# Patient Record
Sex: MALE | Race: WHITE | NOT HISPANIC OR LATINO
[De-identification: names, ages, dates, MRNs, and addresses within clinical notes are randomized per-mention and may not be internally consistent; named-entity substitution may affect disease eponyms.]

---

## 2017-01-06 ENCOUNTER — APPOINTMENT (OUTPATIENT)
Dept: UROLOGY | Facility: CLINIC | Age: 67
End: 2017-01-06

## 2017-01-06 ENCOUNTER — OUTPATIENT (OUTPATIENT)
Dept: OUTPATIENT SERVICES | Facility: HOSPITAL | Age: 67
LOS: 1 days | End: 2017-01-06
Payer: MEDICARE

## 2017-01-06 VITALS
SYSTOLIC BLOOD PRESSURE: 127 MMHG | TEMPERATURE: 97.7 F | DIASTOLIC BLOOD PRESSURE: 82 MMHG | RESPIRATION RATE: 16 BRPM | HEART RATE: 66 BPM

## 2017-01-06 DIAGNOSIS — R35.0 FREQUENCY OF MICTURITION: ICD-10-CM

## 2017-01-06 DIAGNOSIS — Z98.89 OTHER SPECIFIED POSTPROCEDURAL STATES: Chronic | ICD-10-CM

## 2017-01-06 PROCEDURE — 74455 X-RAY URETHRA/BLADDER: CPT

## 2017-01-06 PROCEDURE — 51600 INJECTION FOR BLADDER X-RAY: CPT

## 2017-01-17 DIAGNOSIS — Q64.10 EXSTROPHY OF URINARY BLADDER, UNSPECIFIED: ICD-10-CM

## 2017-01-17 DIAGNOSIS — R10.2 PELVIC AND PERINEAL PAIN: ICD-10-CM

## 2017-04-25 ENCOUNTER — APPOINTMENT (OUTPATIENT)
Dept: UROLOGY | Facility: CLINIC | Age: 67
End: 2017-04-25

## 2017-04-25 ENCOUNTER — OUTPATIENT (OUTPATIENT)
Dept: OUTPATIENT SERVICES | Facility: HOSPITAL | Age: 67
LOS: 1 days | End: 2017-04-25
Payer: MEDICARE

## 2017-04-25 DIAGNOSIS — R35.0 FREQUENCY OF MICTURITION: ICD-10-CM

## 2017-04-25 DIAGNOSIS — Z98.89 OTHER SPECIFIED POSTPROCEDURAL STATES: Chronic | ICD-10-CM

## 2017-04-25 PROCEDURE — 88112 CYTOPATH CELL ENHANCE TECH: CPT | Mod: 26

## 2017-04-25 PROCEDURE — 76775 US EXAM ABDO BACK WALL LIM: CPT

## 2017-04-26 LAB — CORE LAB FLUID CYTOLOGY: NORMAL

## 2017-05-02 DIAGNOSIS — R31.0 GROSS HEMATURIA: ICD-10-CM

## 2017-06-15 ENCOUNTER — FORM ENCOUNTER (OUTPATIENT)
Age: 67
End: 2017-06-15

## 2017-06-16 ENCOUNTER — OUTPATIENT (OUTPATIENT)
Dept: OUTPATIENT SERVICES | Facility: HOSPITAL | Age: 67
LOS: 1 days | End: 2017-06-16
Payer: MEDICARE

## 2017-06-16 ENCOUNTER — APPOINTMENT (OUTPATIENT)
Dept: CT IMAGING | Facility: IMAGING CENTER | Age: 67
End: 2017-06-16

## 2017-06-16 ENCOUNTER — APPOINTMENT (OUTPATIENT)
Dept: UROLOGY | Facility: CLINIC | Age: 67
End: 2017-06-16

## 2017-06-16 DIAGNOSIS — R31.0 GROSS HEMATURIA: ICD-10-CM

## 2017-06-16 DIAGNOSIS — Z98.89 OTHER SPECIFIED POSTPROCEDURAL STATES: Chronic | ICD-10-CM

## 2017-06-16 PROCEDURE — 82565 ASSAY OF CREATININE: CPT

## 2017-06-16 PROCEDURE — 74178 CT ABD&PLV WO CNTR FLWD CNTR: CPT

## 2017-06-19 LAB — CORE LAB FLUID CYTOLOGY: NORMAL

## 2017-07-11 ENCOUNTER — OUTPATIENT (OUTPATIENT)
Dept: OUTPATIENT SERVICES | Facility: HOSPITAL | Age: 67
LOS: 1 days | End: 2017-07-11
Payer: MEDICARE

## 2017-07-11 ENCOUNTER — APPOINTMENT (OUTPATIENT)
Dept: UROLOGY | Facility: CLINIC | Age: 67
End: 2017-07-11

## 2017-07-11 VITALS — HEART RATE: 84 BPM | SYSTOLIC BLOOD PRESSURE: 137 MMHG | RESPIRATION RATE: 16 BRPM | DIASTOLIC BLOOD PRESSURE: 92 MMHG

## 2017-07-11 DIAGNOSIS — R35.0 FREQUENCY OF MICTURITION: ICD-10-CM

## 2017-07-11 DIAGNOSIS — Z98.89 OTHER SPECIFIED POSTPROCEDURAL STATES: Chronic | ICD-10-CM

## 2017-07-11 PROCEDURE — 52000 CYSTOURETHROSCOPY: CPT

## 2017-07-11 RX ORDER — CIPROFLOXACIN HYDROCHLORIDE 250 MG/1
250 TABLET, FILM COATED ORAL
Qty: 30 | Refills: 0 | Status: ACTIVE | COMMUNITY
Start: 2017-07-11 | End: 1900-01-01

## 2017-07-13 DIAGNOSIS — R31.0 GROSS HEMATURIA: ICD-10-CM

## 2017-10-03 ENCOUNTER — APPOINTMENT (OUTPATIENT)
Dept: UROLOGY | Facility: CLINIC | Age: 67
End: 2017-10-03
Payer: MEDICARE

## 2017-10-03 PROCEDURE — 99213 OFFICE O/P EST LOW 20 MIN: CPT

## 2017-12-12 ENCOUNTER — APPOINTMENT (OUTPATIENT)
Dept: UROLOGY | Facility: CLINIC | Age: 67
End: 2017-12-12

## 2018-05-28 ENCOUNTER — FORM ENCOUNTER (OUTPATIENT)
Age: 68
End: 2018-05-28

## 2018-05-29 ENCOUNTER — APPOINTMENT (OUTPATIENT)
Dept: MRI IMAGING | Facility: CLINIC | Age: 68
End: 2018-05-29
Payer: MEDICARE

## 2018-05-29 ENCOUNTER — OUTPATIENT (OUTPATIENT)
Dept: OUTPATIENT SERVICES | Facility: HOSPITAL | Age: 68
LOS: 1 days | End: 2018-05-29

## 2018-05-29 DIAGNOSIS — Q64.10 EXSTROPHY OF URINARY BLADDER, UNSPECIFIED: ICD-10-CM

## 2018-05-29 DIAGNOSIS — Z98.89 OTHER SPECIFIED POSTPROCEDURAL STATES: Chronic | ICD-10-CM

## 2018-05-29 DIAGNOSIS — Z43.6 ENCOUNTER FOR ATTENTION TO OTHER ARTIFICIAL OPENINGS OF URINARY TRACT: ICD-10-CM

## 2018-05-29 PROCEDURE — 72197 MRI PELVIS W/O & W/DYE: CPT | Mod: 26

## 2018-06-12 ENCOUNTER — APPOINTMENT (OUTPATIENT)
Dept: UROLOGY | Facility: CLINIC | Age: 68
End: 2018-06-12
Payer: MEDICARE

## 2018-06-12 PROCEDURE — 99213 OFFICE O/P EST LOW 20 MIN: CPT

## 2018-08-19 ENCOUNTER — MOBILE ON CALL (OUTPATIENT)
Age: 68
End: 2018-08-19

## 2018-08-21 ENCOUNTER — FORM ENCOUNTER (OUTPATIENT)
Age: 68
End: 2018-08-21

## 2018-08-22 ENCOUNTER — OUTPATIENT (OUTPATIENT)
Dept: OUTPATIENT SERVICES | Facility: HOSPITAL | Age: 68
LOS: 1 days | End: 2018-08-22
Payer: MEDICARE

## 2018-08-22 ENCOUNTER — APPOINTMENT (OUTPATIENT)
Dept: CT IMAGING | Facility: CLINIC | Age: 68
End: 2018-08-22
Payer: MEDICARE

## 2018-08-22 DIAGNOSIS — Z00.8 ENCOUNTER FOR OTHER GENERAL EXAMINATION: ICD-10-CM

## 2018-08-22 DIAGNOSIS — Z98.89 OTHER SPECIFIED POSTPROCEDURAL STATES: Chronic | ICD-10-CM

## 2018-08-22 PROCEDURE — 74178 CT ABD&PLV WO CNTR FLWD CNTR: CPT | Mod: 26

## 2018-08-22 PROCEDURE — 74178 CT ABD&PLV WO CNTR FLWD CNTR: CPT

## 2018-12-10 ENCOUNTER — FORM ENCOUNTER (OUTPATIENT)
Age: 68
End: 2018-12-10

## 2018-12-11 ENCOUNTER — OUTPATIENT (OUTPATIENT)
Dept: OUTPATIENT SERVICES | Facility: HOSPITAL | Age: 68
LOS: 1 days | End: 2018-12-11
Payer: MEDICARE

## 2018-12-11 ENCOUNTER — APPOINTMENT (OUTPATIENT)
Dept: UROLOGY | Facility: CLINIC | Age: 68
End: 2018-12-11
Payer: MEDICARE

## 2018-12-11 ENCOUNTER — APPOINTMENT (OUTPATIENT)
Dept: ULTRASOUND IMAGING | Facility: IMAGING CENTER | Age: 68
End: 2018-12-11
Payer: MEDICARE

## 2018-12-11 DIAGNOSIS — Z98.89 OTHER SPECIFIED POSTPROCEDURAL STATES: Chronic | ICD-10-CM

## 2018-12-11 DIAGNOSIS — Z43.6 ENCOUNTER FOR ATTENTION TO OTHER ARTIFICIAL OPENINGS OF URINARY TRACT: ICD-10-CM

## 2018-12-11 PROCEDURE — 76775 US EXAM ABDO BACK WALL LIM: CPT

## 2018-12-11 PROCEDURE — 76775 US EXAM ABDO BACK WALL LIM: CPT | Mod: 26

## 2018-12-11 PROCEDURE — 99213 OFFICE O/P EST LOW 20 MIN: CPT

## 2019-06-21 ENCOUNTER — APPOINTMENT (OUTPATIENT)
Dept: UROLOGY | Facility: CLINIC | Age: 69
End: 2019-06-21
Payer: MEDICARE

## 2019-06-21 ENCOUNTER — OUTPATIENT (OUTPATIENT)
Dept: OUTPATIENT SERVICES | Facility: HOSPITAL | Age: 69
LOS: 1 days | End: 2019-06-21
Payer: MEDICARE

## 2019-06-21 DIAGNOSIS — Z87.448 PERSONAL HISTORY OF OTHER DISEASES OF URINARY SYSTEM: ICD-10-CM

## 2019-06-21 DIAGNOSIS — R35.0 FREQUENCY OF MICTURITION: ICD-10-CM

## 2019-06-21 DIAGNOSIS — Z43.6 ENCOUNTER FOR ATTENTION TO OTHER ARTIFICIAL OPENINGS OF URINARY TRACT: ICD-10-CM

## 2019-06-21 DIAGNOSIS — Z98.89 OTHER SPECIFIED POSTPROCEDURAL STATES: Chronic | ICD-10-CM

## 2019-06-21 DIAGNOSIS — R10.2 PELVIC AND PERINEAL PAIN: ICD-10-CM

## 2019-06-21 PROCEDURE — 76775 US EXAM ABDO BACK WALL LIM: CPT | Mod: 26

## 2019-06-21 PROCEDURE — 76775 US EXAM ABDO BACK WALL LIM: CPT

## 2019-06-21 PROCEDURE — 99212 OFFICE O/P EST SF 10 MIN: CPT | Mod: 25

## 2019-06-21 NOTE — HISTORY OF PRESENT ILLNESS
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 he was born with exstrophy. Last year  he had complained about having pelvic pain - intermittent sharp pains; no change in urethral discharge and no bleeding. Had him get a CT and MRI as concerned about underlying malignancy in remaining organs (cystectomy as child). - ok; some dilated SV. Seems to have resolved though has occasional discomfort. However more recently he felt , overall, not well with myriad of non-de script complaints.  More concerning intermittent bouts of gross hematuria - no flank pain or clots. Blood in urine and NOT from stoma.  He saw local urologist and PSA Ok.\par  He still has some pelvic discomfort - not as before and lasts for shorter periods of time.\par Repeat MRI - no change ion size or nature of lesion. \par  No issues with stoma - changes twice a week. \par Major issues now is recovery from neck surgery. \par no more hematuria since beginning  of Summer and no more pelvic pain issues.\par USG today no hydronephrosis\par

## 2019-06-25 DIAGNOSIS — R10.2 PELVIC AND PERINEAL PAIN: ICD-10-CM

## 2019-06-25 DIAGNOSIS — Z87.448 PERSONAL HISTORY OF OTHER DISEASES OF URINARY SYSTEM: ICD-10-CM

## 2019-06-25 DIAGNOSIS — Z43.6 ENCOUNTER FOR ATTENTION TO OTHER ARTIFICIAL OPENINGS OF URINARY TRACT: ICD-10-CM

## 2019-12-04 ENCOUNTER — CLINICAL ADVICE (OUTPATIENT)
Age: 69
End: 2019-12-04

## 2019-12-06 ENCOUNTER — APPOINTMENT (OUTPATIENT)
Dept: UROLOGY | Facility: CLINIC | Age: 69
End: 2019-12-06

## 2020-10-27 ENCOUNTER — APPOINTMENT (OUTPATIENT)
Dept: UROLOGY | Facility: CLINIC | Age: 70
End: 2020-10-27
Payer: MEDICARE

## 2020-10-27 VITALS — HEART RATE: 71 BPM | DIASTOLIC BLOOD PRESSURE: 84 MMHG | SYSTOLIC BLOOD PRESSURE: 126 MMHG

## 2020-10-27 VITALS — TEMPERATURE: 96.5 F

## 2020-10-27 DIAGNOSIS — R31.0 GROSS HEMATURIA: ICD-10-CM

## 2020-10-27 PROCEDURE — 99212 OFFICE O/P EST SF 10 MIN: CPT

## 2020-10-27 NOTE — HISTORY OF PRESENT ILLNESS
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 he was born with exstrophy. Last year  he had complained about having pelvic pain - intermittent sharp pains; no change in urethral discharge and no bleeding. Had him get a CT and MRI as concerned about underlying malignancy in remaining organs (cystectomy as child). - ok; some dilated SV. Seems to have resolved though has occasional discomfort. However more recently he felt , overall, not well with myriad of non-de script complaints.  More concerning intermittent bouts of gross hematuria - no flank pain or clots. Blood in urine and NOT from stoma.  He saw local urologist and PSA Ok.\par  He still has some pelvic discomfort - not as before and lasts for shorter periods of time.\par Repeat MRI - no change ion size or nature of lesion. \par  No issues with stoma - changes twice a week. \par Major issues now is recovery from neck surgery. \par no more hematuria since beginning  of Summer and no more pelvic pain issues.\par USG today no hydronephrosis\par \par Here with hematuria starting last week with clots. . Also RLQ pain with bloating. Didn't feel well with HA. No fevers LAst 3 days and all resolved. no pelvic pain or rectal pain. \par

## 2020-10-30 ENCOUNTER — APPOINTMENT (OUTPATIENT)
Dept: CT IMAGING | Facility: CLINIC | Age: 70
End: 2020-10-30
Payer: MEDICARE

## 2020-10-30 PROCEDURE — 74178 CT ABD&PLV WO CNTR FLWD CNTR: CPT | Mod: MH

## 2020-10-30 PROCEDURE — Q9967F: CUSTOM

## 2020-10-30 PROCEDURE — 82565A: CUSTOM | Mod: QW

## 2021-01-25 ENCOUNTER — NON-APPOINTMENT (OUTPATIENT)
Age: 71
End: 2021-01-25

## 2021-10-26 ENCOUNTER — APPOINTMENT (OUTPATIENT)
Dept: UROLOGY | Facility: CLINIC | Age: 71
End: 2021-10-26
Payer: MEDICARE

## 2021-10-26 ENCOUNTER — OUTPATIENT (OUTPATIENT)
Dept: OUTPATIENT SERVICES | Facility: HOSPITAL | Age: 71
LOS: 1 days | End: 2021-10-26
Payer: MEDICARE

## 2021-10-26 DIAGNOSIS — Z98.89 OTHER SPECIFIED POSTPROCEDURAL STATES: Chronic | ICD-10-CM

## 2021-10-26 PROCEDURE — 76775 US EXAM ABDO BACK WALL LIM: CPT

## 2021-10-26 PROCEDURE — 76775 US EXAM ABDO BACK WALL LIM: CPT | Mod: 26

## 2021-10-26 PROCEDURE — 99212 OFFICE O/P EST SF 10 MIN: CPT

## 2021-10-26 NOTE — HISTORY OF PRESENT ILLNESS
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 he was born with exstrophy. Last year  he had complained about having pelvic pain - intermittent sharp pains; no change in urethral discharge and no bleeding. Had him get a CT and MRI as concerned about underlying malignancy in remaining organs (cystectomy as child). - ok; some dilated SV. Seems to have resolved though has occasional discomfort. However more recently he felt , overall, not well with myriad of non-de script complaints.  More concerning intermittent bouts of gross hematuria - no flank pain or clots. Blood in urine and NOT from stoma.  He saw local urologist and PSA Ok.\par  He still has some pelvic discomfort - not as before and lasts for shorter periods of time.\par Repeat MRI - no change ion size or nature of lesion. \par  No issues with stoma - changes twice a week. \par Major issues now is recovery from neck surgery. \par no more hematuria since beginning  of Summer and no more pelvic pain issues.\par USG today no hydronephrosis\par \par Here with hematuria starting last week with clots. . Also RLQ pain with bloating. Didn't feel well with HA. No fevers LAst 3 days and all resolved. no pelvic pain or rectal pain. \par \par 10/21 - here for f/o doing well. No episodes of hematuria and the pelvic discomfort he had experienced no more. 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 \par

## 2021-11-17 DIAGNOSIS — Z93.6 OTHER ARTIFICIAL OPENINGS OF URINARY TRACT STATUS: ICD-10-CM

## 2022-12-02 ENCOUNTER — APPOINTMENT (OUTPATIENT)
Dept: UROLOGY | Facility: CLINIC | Age: 72
End: 2022-12-02

## 2022-12-02 ENCOUNTER — OUTPATIENT (OUTPATIENT)
Dept: OUTPATIENT SERVICES | Facility: HOSPITAL | Age: 72
LOS: 1 days | End: 2022-12-02
Payer: MEDICARE

## 2022-12-02 DIAGNOSIS — R35.0 FREQUENCY OF MICTURITION: ICD-10-CM

## 2022-12-02 DIAGNOSIS — Z98.89 OTHER SPECIFIED POSTPROCEDURAL STATES: Chronic | ICD-10-CM

## 2022-12-02 PROCEDURE — 76775 US EXAM ABDO BACK WALL LIM: CPT | Mod: 26

## 2022-12-02 PROCEDURE — 76775 US EXAM ABDO BACK WALL LIM: CPT

## 2022-12-02 PROCEDURE — 99212 OFFICE O/P EST SF 10 MIN: CPT

## 2022-12-02 NOTE — HISTORY OF PRESENT ILLNESS
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 he was born with exstrophy. Last year  he had complained about having pelvic pain - intermittent sharp pains; no change in urethral discharge and no bleeding. Had him get a CT and MRI as concerned about underlying malignancy in remaining organs (cystectomy as child). - ok; some dilated SV. Seems to have resolved though has occasional discomfort. However more recently he felt , overall, not well with myriad of non-de script complaints.  More concerning intermittent bouts of gross hematuria - no flank pain or clots. Blood in urine and NOT from stoma.  He saw local urologist and PSA Ok.\par  He still has some pelvic discomfort - not as before and lasts for shorter periods of time.\par Repeat MRI - no change ion size or nature of lesion. \par  No issues with stoma - changes twice a week. \par Major issues now is recovery from neck surgery. \par no more hematuria since beginning  of Summer and no more pelvic pain issues.\par USG today no hydronephrosis\par \par Here with hematuria starting last week with clots. . Also RLQ pain with bloating. Didn't feel well with HA. No fevers LAst 3 days and all resolved. no pelvic pain or rectal pain. \par \par 10/21 - here for f/o doing well. No episodes of hematuria and the pelvic discomfort he had experienced no more. 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 \par \par 12/22 doing well. No more hematuria or pelvic pain/discharge \par ULS  - no hydro

## 2022-12-05 DIAGNOSIS — Z93.6 OTHER ARTIFICIAL OPENINGS OF URINARY TRACT STATUS: ICD-10-CM

## 2023-06-07 ENCOUNTER — OUTPATIENT (OUTPATIENT)
Dept: OUTPATIENT SERVICES | Facility: HOSPITAL | Age: 73
LOS: 1 days | End: 2023-06-07
Payer: COMMERCIAL

## 2023-06-07 ENCOUNTER — APPOINTMENT (OUTPATIENT)
Dept: UROLOGY | Facility: CLINIC | Age: 73
End: 2023-06-07
Payer: MEDICARE

## 2023-06-07 DIAGNOSIS — R35.0 FREQUENCY OF MICTURITION: ICD-10-CM

## 2023-06-07 DIAGNOSIS — Z98.89 OTHER SPECIFIED POSTPROCEDURAL STATES: Chronic | ICD-10-CM

## 2023-06-07 PROCEDURE — 76775 US EXAM ABDO BACK WALL LIM: CPT

## 2023-06-07 PROCEDURE — 99212 OFFICE O/P EST SF 10 MIN: CPT

## 2023-06-07 PROCEDURE — 76775 US EXAM ABDO BACK WALL LIM: CPT | Mod: 26

## 2023-06-08 NOTE — HISTORY OF PRESENT ILLNESS
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 he was born with exstrophy. Last year  he had complained about having pelvic pain - intermittent sharp pains; no change in urethral discharge and no bleeding. Had him get a CT and MRI as concerned about underlying malignancy in remaining organs (cystectomy as child). - ok; some dilated SV. Seems to have resolved though has occasional discomfort. However more recently he felt , overall, not well with myriad of non-de script complaints.  More concerning intermittent bouts of gross hematuria - no flank pain or clots. Blood in urine and NOT from stoma.  He saw local urologist and PSA Ok.\par  He still has some pelvic discomfort - not as before and lasts for shorter periods of time.\par Repeat MRI - no change ion size or nature of lesion. \par  No issues with stoma - changes twice a week. \par Major issues now is recovery from neck surgery. \par no more hematuria since beginning  of Summer and no more pelvic pain issues.\par USG today no hydronephrosis\par \par Here with hematuria starting last week with clots. . Also RLQ pain with bloating. Didn't feel well with HA. No fevers LAst 3 days and all resolved. no pelvic pain or rectal pain. \par \par 10/21 - here for f/o doing well. No episodes of hematuria and the pelvic discomfort he had experienced no more. 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 \par \par 12/22 doing well. No more hematuria or pelvic pain/discharge \par ULS  - no hydro \par \par 6/23 - doing well - urine clear and no dicharge from urethra.\par no pelvic pain.\par has ED and working around for intimacy

## 2023-06-09 DIAGNOSIS — Z93.6 OTHER ARTIFICIAL OPENINGS OF URINARY TRACT STATUS: ICD-10-CM

## 2023-06-15 ENCOUNTER — OFFICE (OUTPATIENT)
Dept: URBAN - METROPOLITAN AREA CLINIC 113 | Facility: CLINIC | Age: 73
Setting detail: OPHTHALMOLOGY
End: 2023-06-15
Payer: MEDICARE

## 2023-06-15 DIAGNOSIS — H43.393: ICD-10-CM

## 2023-06-15 DIAGNOSIS — H25.13: ICD-10-CM

## 2023-06-15 PROCEDURE — 92250 FUNDUS PHOTOGRAPHY W/I&R: CPT | Performed by: OPHTHALMOLOGY

## 2023-06-15 PROCEDURE — 92014 COMPRE OPH EXAM EST PT 1/>: CPT | Performed by: OPHTHALMOLOGY

## 2023-06-15 ASSESSMENT — REFRACTION_CURRENTRX
OS_ADD: +2.50
OD_OVR_VA: 20/
OS_SPHERE: -1.50
OS_AXIS: 094
OD_CYLINDER: -1.25
OS_CYLINDER: -1.00
OS_SPHERE: -1.75
OD_VPRISM_DIRECTION: SV
OS_OVR_VA: 20/
OS_CYLINDER: -1.00
OS_ADD: +2.50
OD_SPHERE: -2.00
OD_AXIS: 083
OD_ADD: +1.75
OS_CYLINDER: -1.00
OS_VPRISM_DIRECTION: PROGS
OS_SPHERE: -1.75
OD_ADD: +2.00
OD_AXIS: 089
OD_OVR_VA: 20/
OS_VPRISM_DIRECTION: SV
OD_SPHERE: -2.00
OS_AXIS: 101
OS_OVR_VA: 20/
OD_CYLINDER: -1.25
OD_OVR_VA: 20/
OD_CYLINDER: -1.25
OS_VPRISM_DIRECTION: PROGS
OS_AXIS: 106
OD_AXIS: 082
OD_VPRISM_DIRECTION: PROGS
OD_SPHERE: -2.00
OS_OVR_VA: 20/
OD_VPRISM_DIRECTION: PROGS

## 2023-06-15 ASSESSMENT — REFRACTION_MANIFEST
OD_AXIS: 085
OD_SPHERE: -1.75
OD_CYLINDER: -1.50
OS_AXIS: 095
OD_VA1: 20/20
OS_SPHERE: -1.50
OD_ADD: +2.50
OS_ADD: +2.50
OS_CYLINDER: -1.25
OS_VA1: 20/20

## 2023-06-15 ASSESSMENT — REFRACTION_AUTOREFRACTION
OS_CYLINDER: -1.75
OD_AXIS: 088
OD_CYLINDER: -1.75
OD_SPHERE: -1.50
OS_SPHERE: -1.50
OS_AXIS: 094

## 2023-06-15 ASSESSMENT — SPHEQUIV_DERIVED
OD_SPHEQUIV: -2.375
OS_SPHEQUIV: -2.125
OS_SPHEQUIV: -2.375
OD_SPHEQUIV: -2.5

## 2023-06-15 ASSESSMENT — AXIALLENGTH_DERIVED
OD_AL: 24.4067
OS_AL: 24.3517
OS_AL: 24.4559
OD_AL: 24.4589

## 2023-06-15 ASSESSMENT — VISUAL ACUITY
OS_BCVA: 20/30-1
OD_BCVA: 20/30-1

## 2023-06-15 ASSESSMENT — KERATOMETRY
OD_K2POWER_DIOPTERS: 44.25
OS_K1POWER_DIOPTERS: 43.50
OS_K2POWER_DIOPTERS: 44.00
OD_K1POWER_DIOPTERS: 43.50
OD_AXISANGLE_DEGREES: 014
OS_AXISANGLE_DEGREES: 178

## 2023-06-15 ASSESSMENT — TONOMETRY: OD_IOP_MMHG: 17

## 2023-06-15 ASSESSMENT — CONFRONTATIONAL VISUAL FIELD TEST (CVF)
OS_FINDINGS: FULL
OD_FINDINGS: FULL

## 2023-09-25 ENCOUNTER — APPOINTMENT (OUTPATIENT)
Dept: UROLOGY | Facility: CLINIC | Age: 73
End: 2023-09-25
Payer: MEDICARE

## 2023-09-25 ENCOUNTER — NON-APPOINTMENT (OUTPATIENT)
Age: 73
End: 2023-09-25

## 2023-09-25 DIAGNOSIS — N34.2 OTHER URETHRITIS: ICD-10-CM

## 2023-09-25 DIAGNOSIS — R39.9 UNSPECIFIED SYMPTOMS AND SIGNS INVOLVING THE GENITOURINARY SYSTEM: ICD-10-CM

## 2023-09-25 PROCEDURE — 99214 OFFICE O/P EST MOD 30 MIN: CPT

## 2023-09-25 RX ORDER — CIPROFLOXACIN HYDROCHLORIDE 500 MG/1
500 TABLET, FILM COATED ORAL TWICE DAILY
Qty: 28 | Refills: 0 | Status: ACTIVE | COMMUNITY
Start: 2023-09-25 | End: 1900-01-01

## 2023-09-25 RX ORDER — NAPROXEN 500 MG/1
500 TABLET ORAL
Qty: 20 | Refills: 0 | Status: ACTIVE | COMMUNITY
Start: 2023-09-25 | End: 1900-01-01

## 2023-09-26 LAB
ANION GAP SERPL CALC-SCNC: 11 MMOL/L
APPEARANCE: ABNORMAL
BACTERIA: ABNORMAL /HPF
BILIRUBIN URINE: NEGATIVE
BLOOD URINE: ABNORMAL
BUN SERPL-MCNC: 14 MG/DL
CALCIUM SERPL-MCNC: 9.9 MG/DL
CAST: 17 /LPF
CHLORIDE SERPL-SCNC: 105 MMOL/L
CO2 SERPL-SCNC: 25 MMOL/L
COLOR: YELLOW
CREAT SERPL-MCNC: 0.81 MG/DL
EGFR: 92 ML/MIN/1.73M2
EPITHELIAL CELLS: 4 /HPF
GLUCOSE QUALITATIVE U: NEGATIVE MG/DL
GLUCOSE SERPL-MCNC: 71 MG/DL
HCT VFR BLD CALC: 41.9 %
HGB BLD-MCNC: 13.6 G/DL
HYALINE CASTS: PRESENT
KETONES URINE: NEGATIVE MG/DL
LEUKOCYTE ESTERASE URINE: ABNORMAL
MCHC RBC-ENTMCNC: 30 PG
MCHC RBC-ENTMCNC: 32.5 GM/DL
MCV RBC AUTO: 92.5 FL
MICROSCOPIC-UA: NORMAL
NITRITE URINE: NEGATIVE
PH URINE: 7.5
PLATELET # BLD AUTO: 272 K/UL
POTASSIUM SERPL-SCNC: 4.8 MMOL/L
PROTEIN URINE: NORMAL MG/DL
RBC # BLD: 4.53 M/UL
RBC # FLD: 13.5 %
RED BLOOD CELLS URINE: 1 /HPF
REVIEW: NORMAL
SODIUM SERPL-SCNC: 142 MMOL/L
SPECIFIC GRAVITY URINE: 1.01
UROBILINOGEN URINE: 0.2 MG/DL
WBC # FLD AUTO: 10.61 K/UL
WHITE BLOOD CELLS URINE: 27 /HPF

## 2023-09-27 ENCOUNTER — NON-APPOINTMENT (OUTPATIENT)
Age: 73
End: 2023-09-27

## 2023-09-28 ENCOUNTER — NON-APPOINTMENT (OUTPATIENT)
Age: 73
End: 2023-09-28

## 2023-09-28 LAB — BACTERIA GENITAL AEROBE CULT: ABNORMAL

## 2023-09-29 RX ORDER — CIPROFLOXACIN HYDROCHLORIDE 500 MG/1
500 TABLET, FILM COATED ORAL TWICE DAILY
Qty: 14 | Refills: 0 | Status: COMPLETED | COMMUNITY
Start: 2018-08-20 | End: 2023-09-29

## 2023-10-03 LAB
BACTERIA UR CULT: ABNORMAL
MYCOPLASMA HOMINIS CULTURE: NEGATIVE
UREAPLASMA CULTURE: NEGATIVE

## 2023-10-11 ENCOUNTER — APPOINTMENT (OUTPATIENT)
Dept: UROLOGY | Facility: CLINIC | Age: 73
End: 2023-10-11
Payer: MEDICARE

## 2023-10-11 VITALS
HEART RATE: 72 BPM | DIASTOLIC BLOOD PRESSURE: 72 MMHG | SYSTOLIC BLOOD PRESSURE: 117 MMHG | TEMPERATURE: 98.3 F | RESPIRATION RATE: 16 BRPM

## 2023-10-11 DIAGNOSIS — Q64.10 EXSTROPHY OF URINARY BLADDER, UNSPECIFIED: ICD-10-CM

## 2023-10-11 DIAGNOSIS — B36.9 SUPERFICIAL MYCOSIS, UNSPECIFIED: ICD-10-CM

## 2023-10-11 PROCEDURE — 99213 OFFICE O/P EST LOW 20 MIN: CPT

## 2023-10-11 RX ORDER — NYSTATIN AND TRIAMCINOLONE ACETONIDE 100000; 1 MG/G; MG/G
100000-0.1 CREAM TOPICAL TWICE DAILY
Qty: 1 | Refills: 3 | Status: ACTIVE | COMMUNITY
Start: 2023-10-11 | End: 1900-01-01

## 2023-10-11 RX ORDER — FLUCONAZOLE 150 MG/1
150 TABLET ORAL DAILY
Qty: 5 | Refills: 0 | Status: ACTIVE | COMMUNITY
Start: 2023-10-11 | End: 1900-01-01

## 2023-10-12 ENCOUNTER — APPOINTMENT (OUTPATIENT)
Dept: MRI IMAGING | Facility: CLINIC | Age: 73
End: 2023-10-12

## 2023-10-19 ENCOUNTER — APPOINTMENT (OUTPATIENT)
Dept: MRI IMAGING | Facility: CLINIC | Age: 73
End: 2023-10-19
Payer: MEDICARE

## 2023-10-19 ENCOUNTER — OUTPATIENT (OUTPATIENT)
Dept: OUTPATIENT SERVICES | Facility: HOSPITAL | Age: 73
LOS: 1 days | End: 2023-10-19
Payer: COMMERCIAL

## 2023-10-19 DIAGNOSIS — Z98.89 OTHER SPECIFIED POSTPROCEDURAL STATES: Chronic | ICD-10-CM

## 2023-10-19 DIAGNOSIS — Q64.10 EXSTROPHY OF URINARY BLADDER, UNSPECIFIED: ICD-10-CM

## 2023-10-19 DIAGNOSIS — R36.9 URETHRAL DISCHARGE, UNSPECIFIED: ICD-10-CM

## 2023-10-19 PROCEDURE — 72197 MRI PELVIS W/O & W/DYE: CPT | Mod: 26

## 2023-10-19 PROCEDURE — 72197 MRI PELVIS W/O & W/DYE: CPT

## 2023-10-19 PROCEDURE — A9585: CPT

## 2023-11-12 ENCOUNTER — TRANSCRIPTION ENCOUNTER (OUTPATIENT)
Age: 73
End: 2023-11-12

## 2023-11-13 ENCOUNTER — APPOINTMENT (OUTPATIENT)
Dept: UROLOGY | Facility: AMBULATORY SURGERY CENTER | Age: 73
End: 2023-11-13

## 2023-11-13 ENCOUNTER — TRANSCRIPTION ENCOUNTER (OUTPATIENT)
Age: 73
End: 2023-11-13

## 2023-11-13 ENCOUNTER — OUTPATIENT (OUTPATIENT)
Dept: OUTPATIENT SERVICES | Facility: HOSPITAL | Age: 73
LOS: 1 days | Discharge: HOME | End: 2023-11-13
Payer: MEDICARE

## 2023-11-13 ENCOUNTER — RESULT REVIEW (OUTPATIENT)
Age: 73
End: 2023-11-13

## 2023-11-13 VITALS
TEMPERATURE: 98 F | OXYGEN SATURATION: 98 % | DIASTOLIC BLOOD PRESSURE: 67 MMHG | WEIGHT: 173.94 LBS | HEART RATE: 67 BPM | RESPIRATION RATE: 16 BRPM | SYSTOLIC BLOOD PRESSURE: 133 MMHG | HEIGHT: 65 IN

## 2023-11-13 VITALS
DIASTOLIC BLOOD PRESSURE: 68 MMHG | RESPIRATION RATE: 18 BRPM | HEART RATE: 64 BPM | TEMPERATURE: 98 F | OXYGEN SATURATION: 100 % | SYSTOLIC BLOOD PRESSURE: 103 MMHG

## 2023-11-13 DIAGNOSIS — Q64.10 EXSTROPHY OF URINARY BLADDER, UNSPECIFIED: ICD-10-CM

## 2023-11-13 DIAGNOSIS — Z98.89 OTHER SPECIFIED POSTPROCEDURAL STATES: Chronic | ICD-10-CM

## 2023-11-13 PROCEDURE — 88305 TISSUE EXAM BY PATHOLOGIST: CPT | Mod: 26

## 2023-11-13 PROCEDURE — 53450 REVISION OF URETHRA: CPT

## 2023-11-13 DEVICE — GUIDEWIRE SENSOR DUAL-FLEX NITINOL STRAIGHT .038" X 150CM: Type: IMPLANTABLE DEVICE | Status: FUNCTIONAL

## 2023-11-13 DEVICE — URETERAL CATH OPEN END 5FR 70CM: Type: IMPLANTABLE DEVICE | Status: FUNCTIONAL

## 2023-11-13 NOTE — ASU PREOPERATIVE ASSESSMENT, ADULT ([facility] ONLY) - PROCEDURE
Flexible cystoscopy, possible laser incision of urethral stricture poss ultrasound guided aspiration of absces

## 2023-11-13 NOTE — ASU DISCHARGE PLAN (ADULT/PEDIATRIC) - ASU DC SPECIAL INSTRUCTIONSFT
You may take up to 650mg of tylenol every 6 hours for pain.  You can alternate this with ibuprofen 400mg every 6 hours.  Do not take more than 4000mg of tylenol or 2400mg of ibuprofen in one day.    Call the office if you have fever greater than 101, pain not relieved with pain medication, nausea/vomiting.    Dr. Hart's office will call to schedule follow-up. You may take up to 650mg of tylenol every 6 hours for pain.  You can alternate this with ibuprofen 400mg every 6 hours.  Do not take more than 4000mg of tylenol or 2400mg of ibuprofen in one day.    Call the office if you have fever greater than 101, pain not relieved with pain medication, nausea/vomiting.    Dr. Hart's office will call to schedule follow-up.    Apply Bacitracin 3x/day for 1 week

## 2023-11-13 NOTE — ASU PREOPERATIVE ASSESSMENT, ADULT ([facility] ONLY) - FALL HARM RISK - HARM RISK INTERVENTIONS

## 2023-11-13 NOTE — ASU DISCHARGE PLAN (ADULT/PEDIATRIC) - FREQUENT HAND WASHING PREVENTS THE SPREAD OF INFECTION.
TRANSFER - OUT REPORT: 
 
Verbal report given to Walter Nichols (name) on Terrence Chu  being transferred to [facility] (unit) for routine progression of care Report consisted of patients Situation, Background, Assessment and  
Recommendations(SBAR). Information from the following report(s) SBAR, ED Summary and MAR was reviewed with the receiving nurse. Lines:    
 
Opportunity for questions and clarification was provided. Patient transported with: 
 TuneIn Statement Selected

## 2023-11-13 NOTE — ASU PREOP CHECKLIST - NS PREOP CHK CHLOROHEX WASH
If you are a smoker, it is important for your health to stop smoking. Please be aware that second hand smoke is also harmful.
N/A

## 2023-11-13 NOTE — ASU DISCHARGE PLAN (ADULT/PEDIATRIC) - CARE PROVIDER_API CALL
Ricardo Hart  Urology  [address] [place] [address], [address] [address]  Phone: [phone]  Fax: [phone]  Follow Up Time:

## 2023-11-13 NOTE — ASU PREOPERATIVE ASSESSMENT, ADULT ([facility] ONLY) - PERSONAL BELONGINGS
Chief Complaint   Patient presents with   • Ear Problem     right ear hearing loss       HISTORY OF PRESENT ILLNESS:  Pleasant 1-year-old female comes in today with mom.  Recent history of some hearing loss subjectively.  Mom states she says hot and what a lot.  Not sure whether was definitive hearing loss or not.  Had 2 audiograms.  First she had a little conductive loss on the left side and then return visit had a little conductive loss on the right side with a flat tympanogram.  I reviewed these with mom.  No significant ear infections pain drainage, etcetera.  No family history of significant head neck disease her ear problems.  Mom knows nothing makes it better or worse.  Otherwise speech seems to be coming along well.  No balance issues.  Fourteen point review of systems otherwise negative.    Medications, allergies and vitals as well as previous medical and social history as outlined in this epic visit as listed and are reviewed and action is taken as appropriate.    PHYSICAL EXAM:  Visit Vitals  Resp 24   Ht 3' 5\" (1.041 m)   Wt 16.1 kg (35 lb 9.6 oz)   BMI 14.89 kg/m²     Patient is alert and cooperative. Mood is normal. The patient appears in no acute distress .  Patient appears normally developed. Vital signs and medications are reviewed as above and normal.  HEENT: There are no obvious masses or swellings.       Cranial nerves II through XII are intact. Facial muscle mobility and strength is normal.   Extraocular motility is intact and there is no nystagmus.   External ears and canals are normal.   Tympanic membranes are pearly gray, and mobile on the left.  On the right, under the microscope I do not detect any middle ear disease or fluid.  She does have some mild retraction on the right side.. Hearing appears grossly intact to voice bilaterally.  Periauricular area palpates normally.  There is no lymphadenitis.  Audiogram reviewed.    ASSESSMENT:  Flat tympanogram right side with mild low-frequency  conductive loss.  Probably due to isolated fluid with recent congestion but now clear.    PLAN:  Will recheck in 3 months during the height of summer with a follow-up audiogram.  Other possibilities of conductive loss involving the middle ear discussed but I do not see fluid currently.  Just some mild retraction.     lock

## 2023-11-14 PROBLEM — E78.5 HYPERLIPIDEMIA, UNSPECIFIED: Chronic | Status: ACTIVE | Noted: 2023-11-13

## 2023-11-14 PROBLEM — I82.409 ACUTE EMBOLISM AND THROMBOSIS OF UNSPECIFIED DEEP VEINS OF UNSPECIFIED LOWER EXTREMITY: Chronic | Status: ACTIVE | Noted: 2023-11-13

## 2023-11-14 PROBLEM — Z86.69 PERSONAL HISTORY OF OTHER DISEASES OF THE NERVOUS SYSTEM AND SENSE ORGANS: Chronic | Status: ACTIVE | Noted: 2023-11-13

## 2023-11-14 PROBLEM — I25.10 ATHEROSCLEROTIC HEART DISEASE OF NATIVE CORONARY ARTERY WITHOUT ANGINA PECTORIS: Chronic | Status: ACTIVE | Noted: 2023-11-13

## 2023-11-14 PROBLEM — I10 ESSENTIAL (PRIMARY) HYPERTENSION: Chronic | Status: ACTIVE | Noted: 2023-11-13

## 2023-11-17 LAB
SURGICAL PATHOLOGY STUDY: SIGNIFICANT CHANGE UP
SURGICAL PATHOLOGY STUDY: SIGNIFICANT CHANGE UP

## 2023-11-18 ENCOUNTER — NON-APPOINTMENT (OUTPATIENT)
Age: 73
End: 2023-11-18

## 2023-11-20 ENCOUNTER — NON-APPOINTMENT (OUTPATIENT)
Age: 73
End: 2023-11-20

## 2023-11-20 ENCOUNTER — APPOINTMENT (OUTPATIENT)
Dept: UROLOGY | Facility: AMBULATORY SURGERY CENTER | Age: 73
End: 2023-11-20

## 2023-12-08 ENCOUNTER — APPOINTMENT (OUTPATIENT)
Dept: UROLOGY | Facility: CLINIC | Age: 73
End: 2023-12-08
Payer: MEDICARE

## 2023-12-08 PROCEDURE — 99024 POSTOP FOLLOW-UP VISIT: CPT

## 2023-12-19 ENCOUNTER — APPOINTMENT (OUTPATIENT)
Dept: UROLOGY | Facility: CLINIC | Age: 73
End: 2023-12-19

## 2024-05-01 ENCOUNTER — OUTPATIENT (OUTPATIENT)
Dept: OUTPATIENT SERVICES | Facility: HOSPITAL | Age: 74
LOS: 1 days | End: 2024-05-01
Payer: COMMERCIAL

## 2024-05-01 ENCOUNTER — APPOINTMENT (OUTPATIENT)
Dept: UROLOGY | Facility: CLINIC | Age: 74
End: 2024-05-01
Payer: MEDICARE

## 2024-05-01 DIAGNOSIS — R35.0 FREQUENCY OF MICTURITION: ICD-10-CM

## 2024-05-01 DIAGNOSIS — Z98.890 OTHER SPECIFIED POSTPROCEDURAL STATES: Chronic | ICD-10-CM

## 2024-05-01 DIAGNOSIS — Z93.6 OTHER ARTIFICIAL OPENINGS OF URINARY TRACT STATUS: ICD-10-CM

## 2024-05-01 DIAGNOSIS — Z98.89 OTHER SPECIFIED POSTPROCEDURAL STATES: Chronic | ICD-10-CM

## 2024-05-01 DIAGNOSIS — Z95.5 PRESENCE OF CORONARY ANGIOPLASTY IMPLANT AND GRAFT: Chronic | ICD-10-CM

## 2024-05-01 DIAGNOSIS — R36.9 URETHRAL DISCHARGE, UNSPECIFIED: ICD-10-CM

## 2024-05-01 PROCEDURE — 76775 US EXAM ABDO BACK WALL LIM: CPT

## 2024-05-01 PROCEDURE — 99212 OFFICE O/P EST SF 10 MIN: CPT

## 2024-05-01 PROCEDURE — 76775 US EXAM ABDO BACK WALL LIM: CPT | Mod: 26

## 2024-05-01 PROCEDURE — G2211 COMPLEX E/M VISIT ADD ON: CPT

## 2024-05-02 DIAGNOSIS — R36.9 URETHRAL DISCHARGE, UNSPECIFIED: ICD-10-CM

## 2024-05-02 DIAGNOSIS — Z93.6 OTHER ARTIFICIAL OPENINGS OF URINARY TRACT STATUS: ICD-10-CM

## 2024-05-05 NOTE — HISTORY OF PRESENT ILLNESS
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 he was born with exstrophy. Last year  he had complained about having pelvic pain - intermittent sharp pains; no change in urethral discharge and no bleeding. Had him get a CT and MRI as concerned about underlying malignancy in remaining organs (cystectomy as child). - ok; some dilated SV. Seems to have resolved though has occasional discomfort. However more recently he felt , overall, not well with myriad of non-de script complaints.  More concerning intermittent bouts of gross hematuria - no flank pain or clots. Blood in urine and NOT from stoma.  He saw local urologist and PSA Ok.  He still has some pelvic discomfort - not as before and lasts for shorter periods of time. Repeat MRI - no change ion size or nature of lesion.   No issues with stoma - changes twice a week.  Major issues now is recovery from neck surgery.  no more hematuria since beginning  of Summer and no more pelvic pain issues. USG today no hydronephrosis  Here with hematuria starting last week with clots. . Also RLQ pain with bloating. Didn't feel well with HA. No fevers LAst 3 days and all resolved. no pelvic pain or rectal pain.   10/21 - here for f/o doing well. No episodes of hematuria and the pelvic discomfort he had experienced no more.  he has Ed by ejaculates with intimacy and no blood.   12/22 doing well. No more hematuria or pelvic pain/discharge  ULS  - no hydro   6/23 - doing well - urine clear and no discharge from urethra. no pelvic pain. has ED and working around for intimacy   10/23 - had called with some discharge from urethra - thick and painful, causing a painful rash. never had before. had some blood mixed in. no fevers or chills; never had before. saw MK - cultured and treated with appropriate antibiotic. The discharge reduced in volume and more clear. rash still present and painful.   12/23 now S/P penile exploration  - noted blind ending - some drainage with negative biopsies, had pain and burning after but better now. very little drainage now   5/24 - no more discharge and the pain resolved. skin back to normal. notes less ejaculate over time - has ED but reluctant to take sildenafil.  ULS today - no hydronephrosis

## 2024-06-20 ENCOUNTER — OFFICE (OUTPATIENT)
Dept: URBAN - METROPOLITAN AREA CLINIC 113 | Facility: CLINIC | Age: 74
Setting detail: OPHTHALMOLOGY
End: 2024-06-20
Payer: MEDICARE

## 2024-06-20 DIAGNOSIS — H43.393: ICD-10-CM

## 2024-06-20 DIAGNOSIS — H25.13: ICD-10-CM

## 2024-06-20 PROCEDURE — 92250 FUNDUS PHOTOGRAPHY W/I&R: CPT | Performed by: OPHTHALMOLOGY

## 2024-06-20 PROCEDURE — 92014 COMPRE OPH EXAM EST PT 1/>: CPT | Performed by: OPHTHALMOLOGY

## 2024-06-20 ASSESSMENT — CONFRONTATIONAL VISUAL FIELD TEST (CVF)
OD_FINDINGS: FULL
OS_FINDINGS: FULL

## 2024-10-29 ENCOUNTER — NON-APPOINTMENT (OUTPATIENT)
Age: 74
End: 2024-10-29

## 2024-11-04 ENCOUNTER — RESULT REVIEW (OUTPATIENT)
Age: 74
End: 2024-11-04

## 2024-11-06 ENCOUNTER — APPOINTMENT (OUTPATIENT)
Dept: CT IMAGING | Facility: CLINIC | Age: 74
End: 2024-11-06

## 2024-11-06 PROCEDURE — 74178 CT ABD&PLV WO CNTR FLWD CNTR: CPT

## 2024-11-15 ENCOUNTER — APPOINTMENT (OUTPATIENT)
Dept: UROLOGY | Facility: CLINIC | Age: 74
End: 2024-11-15
Payer: MEDICARE

## 2024-11-15 ENCOUNTER — NON-APPOINTMENT (OUTPATIENT)
Age: 74
End: 2024-11-15

## 2024-11-15 VITALS — HEART RATE: 76 BPM | TEMPERATURE: 97.2 F | DIASTOLIC BLOOD PRESSURE: 76 MMHG | SYSTOLIC BLOOD PRESSURE: 128 MMHG

## 2024-11-15 DIAGNOSIS — R31.0 GROSS HEMATURIA: ICD-10-CM

## 2024-11-15 DIAGNOSIS — Z93.6 OTHER ARTIFICIAL OPENINGS OF URINARY TRACT STATUS: ICD-10-CM

## 2024-11-15 PROCEDURE — 99212 OFFICE O/P EST SF 10 MIN: CPT

## 2024-11-27 ENCOUNTER — APPOINTMENT (OUTPATIENT)
Dept: UROLOGY | Facility: CLINIC | Age: 74
End: 2024-11-27

## 2024-12-05 ENCOUNTER — APPOINTMENT (OUTPATIENT)
Dept: ORTHOPEDIC SURGERY | Facility: CLINIC | Age: 74
End: 2024-12-05
Payer: MEDICARE

## 2024-12-05 VITALS
HEIGHT: 66 IN | HEART RATE: 102 BPM | WEIGHT: 164 LBS | DIASTOLIC BLOOD PRESSURE: 95 MMHG | BODY MASS INDEX: 26.36 KG/M2 | SYSTOLIC BLOOD PRESSURE: 144 MMHG

## 2024-12-05 DIAGNOSIS — M41.80 OTHER FORMS OF SCOLIOSIS, SITE UNSPECIFIED: ICD-10-CM

## 2024-12-05 DIAGNOSIS — M47.816 SPONDYLOSIS W/OUT MYELOPATHY OR RADICULOPATHY, LUMBAR REGION: ICD-10-CM

## 2024-12-05 DIAGNOSIS — G95.9 DISEASE OF SPINAL CORD, UNSPECIFIED: ICD-10-CM

## 2024-12-05 PROCEDURE — 72100 X-RAY EXAM L-S SPINE 2/3 VWS: CPT

## 2024-12-05 PROCEDURE — 99204 OFFICE O/P NEW MOD 45 MIN: CPT

## 2024-12-11 ENCOUNTER — APPOINTMENT (OUTPATIENT)
Dept: MRI IMAGING | Facility: CLINIC | Age: 74
End: 2024-12-11
Payer: MEDICARE

## 2024-12-11 PROCEDURE — 72141 MRI NECK SPINE W/O DYE: CPT

## 2024-12-11 PROCEDURE — 72148 MRI LUMBAR SPINE W/O DYE: CPT

## 2024-12-11 PROCEDURE — 72146 MRI CHEST SPINE W/O DYE: CPT

## 2024-12-19 ENCOUNTER — APPOINTMENT (OUTPATIENT)
Dept: ORTHOPEDIC SURGERY | Facility: CLINIC | Age: 74
End: 2024-12-19
Payer: MEDICARE

## 2024-12-19 VITALS
WEIGHT: 164 LBS | SYSTOLIC BLOOD PRESSURE: 121 MMHG | HEIGHT: 66 IN | HEART RATE: 79 BPM | DIASTOLIC BLOOD PRESSURE: 72 MMHG | BODY MASS INDEX: 26.36 KG/M2

## 2024-12-19 DIAGNOSIS — M47.816 SPONDYLOSIS W/OUT MYELOPATHY OR RADICULOPATHY, LUMBAR REGION: ICD-10-CM

## 2024-12-19 DIAGNOSIS — M41.80 OTHER FORMS OF SCOLIOSIS, SITE UNSPECIFIED: ICD-10-CM

## 2024-12-19 PROCEDURE — 99215 OFFICE O/P EST HI 40 MIN: CPT

## 2024-12-19 PROCEDURE — 72040 X-RAY EXAM NECK SPINE 2-3 VW: CPT

## 2024-12-21 ENCOUNTER — NON-APPOINTMENT (OUTPATIENT)
Age: 74
End: 2024-12-21

## 2024-12-30 ENCOUNTER — NON-APPOINTMENT (OUTPATIENT)
Age: 74
End: 2024-12-30

## 2024-12-31 ENCOUNTER — OUTPATIENT (OUTPATIENT)
Dept: OUTPATIENT SERVICES | Facility: HOSPITAL | Age: 74
LOS: 1 days | End: 2024-12-31
Payer: COMMERCIAL

## 2024-12-31 VITALS
HEIGHT: 66 IN | SYSTOLIC BLOOD PRESSURE: 104 MMHG | OXYGEN SATURATION: 98 % | HEART RATE: 66 BPM | WEIGHT: 160.94 LBS | TEMPERATURE: 98 F | DIASTOLIC BLOOD PRESSURE: 70 MMHG | RESPIRATION RATE: 18 BRPM

## 2024-12-31 DIAGNOSIS — Z95.5 PRESENCE OF CORONARY ANGIOPLASTY IMPLANT AND GRAFT: Chronic | ICD-10-CM

## 2024-12-31 DIAGNOSIS — I82.409 ACUTE EMBOLISM AND THROMBOSIS OF UNSPECIFIED DEEP VEINS OF UNSPECIFIED LOWER EXTREMITY: ICD-10-CM

## 2024-12-31 DIAGNOSIS — Z01.818 ENCOUNTER FOR OTHER PREPROCEDURAL EXAMINATION: ICD-10-CM

## 2024-12-31 DIAGNOSIS — Z29.9 ENCOUNTER FOR PROPHYLACTIC MEASURES, UNSPECIFIED: ICD-10-CM

## 2024-12-31 DIAGNOSIS — I10 ESSENTIAL (PRIMARY) HYPERTENSION: ICD-10-CM

## 2024-12-31 DIAGNOSIS — Z98.890 OTHER SPECIFIED POSTPROCEDURAL STATES: ICD-10-CM

## 2024-12-31 DIAGNOSIS — Z98.890 OTHER SPECIFIED POSTPROCEDURAL STATES: Chronic | ICD-10-CM

## 2024-12-31 DIAGNOSIS — Z98.89 OTHER SPECIFIED POSTPROCEDURAL STATES: Chronic | ICD-10-CM

## 2024-12-31 DIAGNOSIS — M48.061 SPINAL STENOSIS, LUMBAR REGION WITHOUT NEUROGENIC CLAUDICATION: ICD-10-CM

## 2024-12-31 DIAGNOSIS — I25.10 ATHEROSCLEROTIC HEART DISEASE OF NATIVE CORONARY ARTERY WITHOUT ANGINA PECTORIS: ICD-10-CM

## 2024-12-31 DIAGNOSIS — M48.062 SPINAL STENOSIS, LUMBAR REGION WITH NEUROGENIC CLAUDICATION: ICD-10-CM

## 2024-12-31 DIAGNOSIS — Z86.69 PERSONAL HISTORY OF OTHER DISEASES OF THE NERVOUS SYSTEM AND SENSE ORGANS: ICD-10-CM

## 2024-12-31 LAB
A1C WITH ESTIMATED AVERAGE GLUCOSE RESULT: 5.6 % — SIGNIFICANT CHANGE UP (ref 4–5.6)
ANION GAP SERPL CALC-SCNC: 11 MMOL/L — SIGNIFICANT CHANGE UP (ref 5–17)
APTT BLD: 35.5 SEC — SIGNIFICANT CHANGE UP (ref 24.5–35.6)
BASOPHILS # BLD AUTO: 0.05 K/UL — SIGNIFICANT CHANGE UP (ref 0–0.2)
BASOPHILS NFR BLD AUTO: 0.8 % — SIGNIFICANT CHANGE UP (ref 0–2)
BLD GP AB SCN SERPL QL: SIGNIFICANT CHANGE UP
BUN SERPL-MCNC: 20.2 MG/DL — HIGH (ref 8–20)
CALCIUM SERPL-MCNC: 9.6 MG/DL — SIGNIFICANT CHANGE UP (ref 8.4–10.5)
CHLORIDE SERPL-SCNC: 105 MMOL/L — SIGNIFICANT CHANGE UP (ref 96–108)
CO2 SERPL-SCNC: 24 MMOL/L — SIGNIFICANT CHANGE UP (ref 22–29)
CREAT SERPL-MCNC: 0.77 MG/DL — SIGNIFICANT CHANGE UP (ref 0.5–1.3)
EGFR: 93 ML/MIN/1.73M2 — SIGNIFICANT CHANGE UP
EOSINOPHIL # BLD AUTO: 0.14 K/UL — SIGNIFICANT CHANGE UP (ref 0–0.5)
EOSINOPHIL NFR BLD AUTO: 2.2 % — SIGNIFICANT CHANGE UP (ref 0–6)
ESTIMATED AVERAGE GLUCOSE: 114 MG/DL — SIGNIFICANT CHANGE UP (ref 68–114)
GLUCOSE SERPL-MCNC: 73 MG/DL — SIGNIFICANT CHANGE UP (ref 70–99)
HCT VFR BLD CALC: 47.3 % — SIGNIFICANT CHANGE UP (ref 39–50)
HGB BLD-MCNC: 15.2 G/DL — SIGNIFICANT CHANGE UP (ref 13–17)
IMM GRANULOCYTES NFR BLD AUTO: 0.5 % — SIGNIFICANT CHANGE UP (ref 0–0.9)
INR BLD: 0.98 RATIO — SIGNIFICANT CHANGE UP (ref 0.85–1.16)
LYMPHOCYTES # BLD AUTO: 1.53 K/UL — SIGNIFICANT CHANGE UP (ref 1–3.3)
LYMPHOCYTES # BLD AUTO: 23.6 % — SIGNIFICANT CHANGE UP (ref 13–44)
MCHC RBC-ENTMCNC: 28.4 PG — SIGNIFICANT CHANGE UP (ref 27–34)
MCHC RBC-ENTMCNC: 32.1 G/DL — SIGNIFICANT CHANGE UP (ref 32–36)
MCV RBC AUTO: 88.4 FL — SIGNIFICANT CHANGE UP (ref 80–100)
MONOCYTES # BLD AUTO: 0.59 K/UL — SIGNIFICANT CHANGE UP (ref 0–0.9)
MONOCYTES NFR BLD AUTO: 9.1 % — SIGNIFICANT CHANGE UP (ref 2–14)
MRSA PCR RESULT.: SIGNIFICANT CHANGE UP
NEUTROPHILS # BLD AUTO: 4.13 K/UL — SIGNIFICANT CHANGE UP (ref 1.8–7.4)
NEUTROPHILS NFR BLD AUTO: 63.8 % — SIGNIFICANT CHANGE UP (ref 43–77)
PLATELET # BLD AUTO: 247 K/UL — SIGNIFICANT CHANGE UP (ref 150–400)
POTASSIUM SERPL-MCNC: 4.5 MMOL/L — SIGNIFICANT CHANGE UP (ref 3.5–5.3)
POTASSIUM SERPL-SCNC: 4.5 MMOL/L — SIGNIFICANT CHANGE UP (ref 3.5–5.3)
PROTHROM AB SERPL-ACNC: 11.4 SEC — SIGNIFICANT CHANGE UP (ref 9.9–13.4)
RBC # BLD: 5.35 M/UL — SIGNIFICANT CHANGE UP (ref 4.2–5.8)
RBC # FLD: 13.5 % — SIGNIFICANT CHANGE UP (ref 10.3–14.5)
S AUREUS DNA NOSE QL NAA+PROBE: SIGNIFICANT CHANGE UP
SODIUM SERPL-SCNC: 140 MMOL/L — SIGNIFICANT CHANGE UP (ref 135–145)
WBC # BLD: 6.47 K/UL — SIGNIFICANT CHANGE UP (ref 3.8–10.5)
WBC # FLD AUTO: 6.47 K/UL — SIGNIFICANT CHANGE UP (ref 3.8–10.5)

## 2024-12-31 PROCEDURE — 36415 COLL VENOUS BLD VENIPUNCTURE: CPT

## 2024-12-31 PROCEDURE — 93005 ELECTROCARDIOGRAM TRACING: CPT

## 2024-12-31 PROCEDURE — 87641 MR-STAPH DNA AMP PROBE: CPT

## 2024-12-31 PROCEDURE — 86900 BLOOD TYPING SEROLOGIC ABO: CPT

## 2024-12-31 PROCEDURE — 87640 STAPH A DNA AMP PROBE: CPT

## 2024-12-31 PROCEDURE — 86901 BLOOD TYPING SEROLOGIC RH(D): CPT

## 2024-12-31 PROCEDURE — 85025 COMPLETE CBC W/AUTO DIFF WBC: CPT

## 2024-12-31 PROCEDURE — 80048 BASIC METABOLIC PNL TOTAL CA: CPT

## 2024-12-31 PROCEDURE — 85730 THROMBOPLASTIN TIME PARTIAL: CPT

## 2024-12-31 PROCEDURE — 86850 RBC ANTIBODY SCREEN: CPT

## 2024-12-31 PROCEDURE — G0463: CPT

## 2024-12-31 PROCEDURE — 83036 HEMOGLOBIN GLYCOSYLATED A1C: CPT

## 2024-12-31 PROCEDURE — 85610 PROTHROMBIN TIME: CPT

## 2024-12-31 PROCEDURE — 93010 ELECTROCARDIOGRAM REPORT: CPT

## 2024-12-31 NOTE — H&P PST ADULT - NSICDXFAMILYHX_GEN_ALL_CORE_FT
FAMILY HISTORY:  Father  Still living? Unknown  Family history of acute lymphoid leukemia, Age at diagnosis: Age Unknown

## 2024-12-31 NOTE — H&P PST ADULT - HISTORY OF PRESENT ILLNESS
74-year-old male present today accompanied by spouse for evaluation of lower back pain. Patient states that he   has had chronic lower back pain for several years. He is seeing pain management Dr. Marley for this who has   provided him with multiple medications including narcotics oxycodone tramadol hydrocodone etc. he states more   acutely approximate 1 week ago he began to experience excruciating pain in the right lower extremity L5/S1   distribution. Pain is described as sharp stabbing excruciating 10/10 on pain scale. He was unable to weight-bear   or ambulate for prolonged periods of time secondary severe pain. He ultimately had to use a walker to get around.  He was seen by Dr. Marley this morning who provided him with an epidural injection and he states that he is   starting to feel some improvement in symptoms. He is now ambulating with the aid of a cane. He does have a   history of a posterior cervical laminectomy fusion for what sounds to be a cervical myelopathy. He has had   residual numbness tingling in the bilateral lower extremities which is only moderately improved and is more   intermittent in nature. He also has significant weakness/difficulty of moving the right lower extremity which again   has been long-term for several years. He describes it as the right lower extremity feeling is heavier than the left   lower extremity. No bowel / bladder incontinence. No saddle anesthesia. He does have some difficulties with   balance but again this has been chronic in nature long-term since previous cervical fusion. Presents today under   the recommendation of Dr. Marley for orthopedic spine opinion/evaluation  12/19/2024: Interval history-patient presenting today for follow-up evaluation accompanied by spouse. He is also   here for MRI review. States that since the last visit he has persistent pain and symptoms. He only find   improvement in previous injection by Dr. Marley for approximately 24 hours and then pain subsequently returned.   He describes some lower back pain but primarily right lower extremity radiculopathy. He states he is unable to   weight-bear/stand for prolonged period of time secondary to worsening pain. Unable to walk for prolonged periods  of time again secondary to pain. He is still taking narcotic medications as per Dr. Marley secondary severe pain etc.  Presents today to discuss alternative treatment options.   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        present today accompanied by spouse for evaluation of lower back pain. Patient states that he   has had chronic lower back pain for several years. He is seeing pain management Dr. Marley for this who has provided him with multiple medications including narcotics oxycodone tramadol hydrocodone etc. he states more acutely approximate 1 week ago he began to experience excruciating pain in the right lower extremity L5/S1 distribution. Pain is described as sharp stabbing excruciating 10/10 on pain scale. He was unable to weight-bear or ambulate for prolonged periods of time secondary severe pain. He ultimately had to use a walker to get around. He was seen by Dr. Marley this morning who provided him with an epidural injection and he states that he is starting to feel some improvement in symptoms. He is now ambulating with the aid of a cane. He does have a history of a posterior cervical laminectomy fusion for what sounds to be a cervical myelopathy. He has had residual numbness tingling in the bilateral lower extremities which is only moderately improved and is more intermittent in nature. He also has significant weakness/difficulty of moving the right lower extremity which again has been long-term for several years. He describes it as the right lower extremity feeling is heavier than the left lower extremity. No bowel / bladder incontinence. No saddle anesthesia. He does have some difficulties with balance but again this has been chronic in nature long-term since previous cervical fusion. Presents today under the recommendation of Dr. Marley for orthopedic spine opinion/evaluation  12/19/2024: Interval history-patient presenting today for follow-up evaluation accompanied by spouse. He is also here for MRI review. States that since the last visit he has persistent pain and symptoms. He only find improvement in previous injection by Dr. Marley for approximately 24 hours and then pain subsequently returned. He describes some lower back pain but primarily right lower extremity radiculopathy. He states he is unable to weight-bear/stand for prolonged period of time secondary to worsening pain. Unable to walk for prolonged periods of time again secondary to pain. He is still taking narcotic medications as per Dr. Marley secondary severe pain etc.  Presents today to discuss alternative treatment options.   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 Patient c/o lower back pain severely worsening sine  thanksgiving. Patient states that he has had chronic lower back pain for several years. He is seeing pain management Dr. Marley for this who has provided him with multiple medications including narcotics oxycodone tramadol hydrocodone etc. he states more acutely occured after Thanksgiving. He began to experience excruciating pain in the right lower extremity L5/S1 distribution. Pain is described as dull aching excruciating 10/10 on pain scale. Range of pain 2-10/10 throughout the day.  He was unable to weight-bear or ambulate for prolonged periods of time secondary severe pain. He ultimately had to use a walker to get around. Patient has had an epidural injections and this has improved symptoms. He is now ambulating with the aid of a walker. He does have a history of a posterior cervical laminectomy fusion for what sounds to be a cervical myelopathy. He has had residual numbness tingling in the bilateral lower extremities which is only moderately improved and is more intermittent in nature. He also has significant weakness/difficulty of moving the right lower extremity which again has been long-term for several years. He describes it as the right lower extremity feeling is heavier than the left lower extremity. No bowel / bladder incontinence. No saddle anesthesia. He does have some difficulties with balance but again this has been chronic in nature long-term since previous cervical fusion. He only find improvement in previous injection by Dr. Marley for approximately 24 hours and then pain subsequently returned. He describes some lower back pain but primarily right lower extremity radiculopathy. He states he is unable to weight-bear/stand for prolonged period of time secondary to worsening pain. Unable to walk for prolonged periods. Patient scheduled for L2-L% laminectomy on 1/13/2025 with Dr. Guzman.  74-year-old male presents to PST with PMH of CAD 2 stents, extrophy of bladder, s/p 30 urological surgeries, encephalopathy, s/p urostomy placed 2015, melanoma s/p removal x2 on neck and right cheek. Patient c/o lower back pain severely worsening sine thanksgiving. Patient states that he has had chronic lower back pain for several years. He is seeing pain management Dr. Marley for this who has provided him with multiple medications including narcotics oxycodone tramadol hydrocodone etc. He began to experience excruciating pain in the right lower extremity L5/S1 distribution. Pain is described as dull aching excruciating 10/10 on pain scale. Range of pain 2-10/10 throughout the day. He was unable to weight-bear or ambulate for prolonged periods of time secondary severe pain. He ultimately had to use a walker to get around. Patient has had an epidural injections and this has improved symptoms. He is now ambulating with the aid of a walker. He does have a history of a posterior cervical laminectomy fusion for what sounds to be a cervical myelopathy. He has had residual numbness tingling in the bilateral lower extremities which is only moderately improved and is more intermittent in nature. He also has significant weakness/difficulty of moving the right lower extremity which again has been long-term for several years. He describes it as the right lower extremity feeling is heavier than the left lower extremity. No bowel/bladder incontinence. No saddle anesthesia. He does have some difficulties with balance but again this has been chronic in nature long-term since previous cervical fusion. He only find improvement in previous injection by Dr. Marley for approximately 24 hours and then pain subsequently returned. He describes some lower back pain but primarily right lower extremity radiculopathy. He states he is unable to weight-bear/stand for prolonged period of time secondary to worsening pain. Unable to walk for prolonged periods. Patient scheduled for L2-L5 laminectomy on 1/13/2025 with Dr. Guzman.

## 2024-12-31 NOTE — H&P PST ADULT - NSICDXPASTSURGICALHX_GEN_ALL_CORE_FT
PAST SURGICAL HISTORY:  H/O laminectomy     History of bladder surgery     History of urinary diversion procedure with urostomy  April 2014 with bag    Stented coronary artery

## 2024-12-31 NOTE — H&P PST ADULT - RESPIRATORY
normal/clear to auscultation bilaterally/no wheezes/no rales/no rhonchi normal/clear to auscultation bilaterally/no wheezes/no rales/no rhonchi/no respiratory distress/no use of accessory muscles/airway patent/breath sounds equal/good air movement/respirations non-labored

## 2024-12-31 NOTE — H&P PST ADULT - OTHER CARE PROVIDERS
Dr. Shaun Chavarria [phone]; Heme - Dr. Aquino [phone], Cardio - Dr. Xiong [phone] Dr. Austin Samuel [phone]; Heme - Dr. Aquino [phone], Cardio - Dr. Nicholas israel  [phone]; Dr. Ricardo Hart  urology [phone] pt has urostomy 2015

## 2024-12-31 NOTE — H&P PST ADULT - PROBLEM SELECTOR PLAN 4
Patient to continue BP medications as usual up to night prior to procedure. Medical and cardiac optimization pending.

## 2024-12-31 NOTE — H&P PST ADULT - MUSCULOSKELETAL
details… normal/ROM intact/normal gait/strength 5/5 bilateral upper extremities/strength 5/5 bilateral lower extremities decreased ROM/decreased ROM due to pain/strength 5/5 bilateral upper extremities/decreased strength

## 2024-12-31 NOTE — H&P PST ADULT - HEMATOLOGY/LYMPHATICS COMMENTS
sees hematologist for occasional iron infusion sees hematologist for occasional iron infusion, anemia , dvt right LE

## 2024-12-31 NOTE — H&P PST ADULT - ASSESSMENT
Patient was given information on spinal procedures, spinebook provided, ERP protocol reviewed with patient, MSSA/MRSA swabbed in PST, results pending and pt. verbalized agreement and understanding. Pt. to have medical optimization with  and pt. verbalized agreement and understanding. Pt. to have cardiac optimization with and pt. verbalized agreement and understanding.     CAPRINI SCORE    AGE RELATED RISK FACTORS                                                             [ ] Age 41-60 years                                            (1 Point)  [ ] Age: 61-74 years                                           (2 Points)                 [ ] Age= 75 years                                                (3 Points)             DISEASE RELATED RISK FACTORS                                                       [ ] Edema in the lower extremities                 (1 Point)                     [ ] Varicose veins                                               (1 Point)                                 [ ] BMI > 25 Kg/m2                                            (1 Point)                                  [ ] Serious infection (ie PNA)                            (1 Point)                     [ ] Lung disease ( COPD, Emphysema)            (1 Point)                                                                          [ ] Acute myocardial infarction                         (1 Point)                  [ ] Congestive heart failure (in the previous month)  (1 Point)         [ ] Inflammatory bowel disease                            (1 Point)                  [ ] Central venous access, PICC or Port               (2 points)       (within the last month)                                                                [ ] Stroke (in the previous month)                        (5 Points)    [ ] Previous or present malignancy                       (2 points)                                                                                                                                                         HEMATOLOGY RELATED FACTORS                                                         [ ] Prior episodes of VTE                                     (3 Points)                     [ ] Positive family history for VTE                      (3 Points)                  [ ] Prothrombin 13910 A                                     (3 Points)                     [ ] Factor V Leiden                                                (3 Points)                        [ ] Lupus anticoagulants                                      (3 Points)                                                           [ ] Anticardiolipin antibodies                              (3 Points)                                                       [ ] High homocysteine in the blood                   (3 Points)                                             [ ] Other congenital or acquired thrombophilia      (3 Points)                                                [ ] Heparin induced thrombocytopenia                  (3 Points)                                        MOBILITY RELATED FACTORS  [ ] Bed rest                                                         (1 Point)  [ ] Plaster cast                                                    (2 points)  [ ] Bed bound for more than 72 hours           (2 Points)    GENDER SPECIFIC FACTORS  [ ] Pregnancy or had a baby within the last month   (1 Point)  [ ] Post-partum < 6 weeks                                   (1 Point)  [ ] Hormonal therapy  or oral contraception   (1 Point)  [ ] History of pregnancy complications              (1 point)  [ ] Unexplained or recurrent              (1 Point)    OTHER RISK FACTORS                                           (1 Point)  [ ] BMI >40, smoking, diabetes requiring insulin, chemotherapy  blood transfusions and length of surgery over 2 hours    SURGERY RELATED RISK FACTORS  [ ]  Section within the last month     (1 Point)  [ ] Minor surgery                                                  (1 Point)  [ ] Arthroscopic surgery                                       (2 Points)  [ ] Planned major surgery lasting more            (2 Points)      than 45 minutes     [ ] Elective hip or knee joint replacement       (5 points)       surgery                                                TRAUMA RELATED RISK FACTORS  [ ] Fracture of the hip, pelvis, or leg                       (5 Points)  [ ] Spinal cord injury resulting in paralysis             (5 points)       (in the previous month)    [ ] Paralysis  (less than 1 month)                             (5 Points)  [ ] Multiple Trauma within 1 month                        (5 Points)    Total Score [        ]    Caprini Score 0-2: Low Risk, NO VTE prophylaxis required for most patients, encourage ambulation  Caprini Score 3-6: Moderate Risk , pharmacologic VTE prophylaxis is indicated for most patients (in the absence of contraindications)  Caprini Score Greater than or =7: High risk, pharmocologic VTE prophylaxis indicated for most patients (in the absence of contraindications)    OPIOID RISK TOOL    YOLIS EACH BOX THAT APPLIES AND ADD TOTALS AT THE END    FAMILY HISTORY OF SUBSTANCE ABUSE                 FEMALE         MALE                                                Alcohol                             [  ]1 pt          [  ]3pts                                               Illegal Durgs                     [  ]2 pts        [  ]3pts                                               Rx Drugs                           [  ]4 pts        [  ]4 pts    PERSONAL HISTORY OF SUBSTANCE ABUSE                                                                                          Alcohol                             [  ]3 pts       [  ]3 pts                                               Illegal Drugs                     [  ]4 pts        [  ]4 pts                                               Rx Drugs                           [  ]5 pts        [  ]5 pts    AGE BETWEEN 16-45 YEARS                                      [  ]1 pt         [  ]1 pt    HISTORY OF PREADOLESCENT   SEXUAL ABUSE                                                             [  ]3 pts        [  ]0pts    PSYCHOLOGICAL DISEASE                     ADD, OCD, Bipolar, Schizophrenia        [  ]2 pts         [  ]2 pts                      Depression                                               [  ]1 pt           [  ]1 pt           SCORING TOTAL   (add numbers and type here)              (*0**)                                     A score of 3 or lower indicated LOW risk for future opioid abuse  A score of 4 to 7 indicated moderate risk for future opioid abuse  A score of 8 or higher indicates a high risk for opioid abuse   Patient was given information on spinal procedures, spinebook provided, ERP protocol reviewed with patient, MSSA/MRSA swabbed in PST, results pending and pt. verbalized agreement and understanding. Pt. to have medical optimization with  and pt. verbalized agreement and understanding. Pt. to have cardiac optimization with and pt. verbalized agreement and understanding.     CAPRINI SCORE    AGE RELATED RISK FACTORS                                                             [ ] Age 41-60 years                                            (1 Point)  [ ] Age: 61-74 years                                           (2 Points)                 [x ] Age= 75 years                                                (3 Points)             DISEASE RELATED RISK FACTORS                                                       [ ] Edema in the lower extremities                 (1 Point)                     [ ] Varicose veins                                               (1 Point)                                 [ x] BMI > 25 Kg/m2                                            (1 Point)                                  [ ] Serious infection (ie PNA)                            (1 Point)                     [ ] Lung disease ( COPD, Emphysema)            (1 Point)                                                                          [ ] Acute myocardial infarction                         (1 Point)                  [ ] Congestive heart failure (in the previous month)  (1 Point)         [ ] Inflammatory bowel disease                            (1 Point)                  [ ] Central venous access, PICC or Port               (2 points)       (within the last month)                                                                [ ] Stroke (in the previous month)                        (5 Points)    [ x] Previous or present malignancy                       (2 points)                                                                                                                                                         HEMATOLOGY RELATED FACTORS                                                         [ ]x Prior episodes of VTE                                     (3 Points)                     [ ] Positive family history for VTE                      (3 Points)                  [ ] Prothrombin 78103 A                                     (3 Points)                     [ ] Factor V Leiden                                                (3 Points)                        [ ] Lupus anticoagulants                                      (3 Points)                                                           [ ] Anticardiolipin antibodies                              (3 Points)                                                       [ ] High homocysteine in the blood                   (3 Points)                                             [ ] Other congenital or acquired thrombophilia      (3 Points)                                                [ ] Heparin induced thrombocytopenia                  (3 Points)                                        MOBILITY RELATED FACTORS  [ ] Bed rest                                                         (1 Point)  [ ] Plaster cast                                                    (2 points)  [ ] Bed bound for more than 72 hours           (2 Points)    GENDER SPECIFIC FACTORS  [ ] Pregnancy or had a baby within the last month   (1 Point)  [ ] Post-partum < 6 weeks                                   (1 Point)  [ ] Hormonal therapy  or oral contraception   (1 Point)  [ ] History of pregnancy complications              (1 point)  [ ] Unexplained or recurrent              (1 Point)    OTHER RISK FACTORS                                           (1 Point)  [ x] BMI >40, smoking, diabetes requiring insulin, chemotherapy  blood transfusions and length of surgery over 2 hours    SURGERY RELATED RISK FACTORS  [ ]  Section within the last month     (1 Point)  [ ] Minor surgery                                                  (1 Point)  [ ] Arthroscopic surgery                                       (2 Points)  [ x] Planned major surgery lasting more            (2 Points)      than 45 minutes     [ ] Elective hip or knee joint replacement       (5 points)       surgery                                                TRAUMA RELATED RISK FACTORS  [ ] Fracture of the hip, pelvis, or leg                       (5 Points)  [ ] Spinal cord injury resulting in paralysis             (5 points)       (in the previous month)    [ ] Paralysis  (less than 1 month)                             (5 Points)  [ ] Multiple Trauma within 1 month                        (5 Points)    Total Score [    12    ]    Caprini Score 0-2: Low Risk, NO VTE prophylaxis required for most patients, encourage ambulation  Caprini Score 3-6: Moderate Risk , pharmacologic VTE prophylaxis is indicated for most patients (in the absence of contraindications)  Caprini Score Greater than or =7: High risk, pharmocologic VTE prophylaxis indicated for most patients (in the absence of contraindications)    OPIOID RISK TOOL    YOLIS EACH BOX THAT APPLIES AND ADD TOTALS AT THE END    FAMILY HISTORY OF SUBSTANCE ABUSE                 FEMALE         MALE                                                Alcohol                             [  ]1 pt          [  ]3pts                                               Illegal Durgs                     [  ]2 pts        [  ]3pts                                               Rx Drugs                           [  ]4 pts        [  ]4 pts    PERSONAL HISTORY OF SUBSTANCE ABUSE                                                                                          Alcohol                             [  ]3 pts       [  ]3 pts                                               Illegal Drugs                     [  ]4 pts        [  ]4 pts                                               Rx Drugs                           [  ]5 pts        [  ]5 pts    AGE BETWEEN 16-45 YEARS                                      [  ]1 pt         [  ]1 pt    HISTORY OF PREADOLESCENT   SEXUAL ABUSE                                                             [  ]3 pts        [  ]0pts    PSYCHOLOGICAL DISEASE                     ADD, OCD, Bipolar, Schizophrenia        [  ]2 pts         [  ]2 pts                      Depression                                               [  ]1 pt           [  ]1 pt           SCORING TOTAL   (add numbers and type here)              (*0**)                                     A score of 3 or lower indicated LOW risk for future opioid abuse  A score of 4 to 7 indicated moderate risk for future opioid abuse  A score of 8 or higher indicates a high risk for opioid abuse   74-year-old male presents to PST with PMH of CAD 2 stents, extrophy of bladder, s/p 30 urological surgeries, encephalopathy, s/p urostomy placed , melanoma s/p removal x2 on neck and right cheek. Patient c/o lower back pain severely worsening sine thanksgiving. Patient states that he has had chronic lower back pain for several years. He is seeing pain management Dr. Marley for this who has provided him with multiple medications including narcotics oxycodone tramadol hydrocodone etc. He began to experience excruciating pain in the right lower extremity L5/S1 distribution. Pain is described as dull aching excruciating 10/10 on pain scale. Range of pain 2-10/10 throughout the day. He was unable to weight-bear or ambulate for prolonged periods of time secondary severe pain. He ultimately had to use a walker to get around. Patient has had an epidural injections and this has improved symptoms. He is now ambulating with the aid of a walker. He does have a history of a posterior cervical laminectomy fusion for what sounds to be a cervical myelopathy. He has had residual numbness tingling in the bilateral lower extremities which is only moderately improved and is more intermittent in nature. He also has significant weakness/difficulty of moving the right lower extremity which again has been long-term for several years. He describes it as the right lower extremity feeling is heavier than the left lower extremity. No bowel/bladder incontinence. No saddle anesthesia. He does have some difficulties with balance but again this has been chronic in nature long-term since previous cervical fusion. He only find improvement in previous injection by Dr. Marley for approximately 24 hours and then pain subsequently returned. He describes some lower back pain but primarily right lower extremity radiculopathy. He states he is unable to weight-bear/stand for prolonged period of time secondary to worsening pain. Unable to walk for prolonged periods. Patient scheduled for L2-L5 laminectomy on 2025 with Dr. Guzman. Patient was given information on spinal procedures, spinebook provided, ERP protocol reviewed with patient, MSSA/MRSA swabbed in PST, results pending and pt. verbalized agreement and understanding. Pt. to have medical optimization with Dr. Samuel and pt. verbalized agreement and understanding. Pt. to have cardiac optimization with Dr. Velasquez and pt. verbalized agreement and understanding. Pt. to have urology clearance with Dr. Hart, brought by pt and placed in chart.      CAPRINI SCORE    AGE RELATED RISK FACTORS                                                             [ ] Age 41-60 years                                            (1 Point)  [ ] Age: 61-74 years                                           (2 Points)                 [x ] Age= 75 years                                                (3 Points)             DISEASE RELATED RISK FACTORS                                                       [ ] Edema in the lower extremities                 (1 Point)                     [ ] Varicose veins                                               (1 Point)                                 [ x] BMI > 25 Kg/m2                                            (1 Point)                                  [ ] Serious infection (ie PNA)                            (1 Point)                     [ ] Lung disease ( COPD, Emphysema)            (1 Point)                                                                          [ ] Acute myocardial infarction                         (1 Point)                  [ ] Congestive heart failure (in the previous month)  (1 Point)         [ ] Inflammatory bowel disease                            (1 Point)                  [ ] Central venous access, PICC or Port               (2 points)       (within the last month)                                                                [ ] Stroke (in the previous month)                        (5 Points)    [ x] Previous or present malignancy                       (2 points)                                                                                                                                                         HEMATOLOGY RELATED FACTORS                                                         [ ]x Prior episodes of VTE                                     (3 Points)                     [ ] Positive family history for VTE                      (3 Points)                  [ ] Prothrombin 41664 A                                     (3 Points)                     [ ] Factor V Leiden                                                (3 Points)                        [ ] Lupus anticoagulants                                      (3 Points)                                                           [ ] Anticardiolipin antibodies                              (3 Points)                                                       [ ] High homocysteine in the blood                   (3 Points)                                             [ ] Other congenital or acquired thrombophilia      (3 Points)                                                [ ] Heparin induced thrombocytopenia                  (3 Points)                                        MOBILITY RELATED FACTORS  [ ] Bed rest                                                         (1 Point)  [ ] Plaster cast                                                    (2 points)  [ ] Bed bound for more than 72 hours           (2 Points)    GENDER SPECIFIC FACTORS  [ ] Pregnancy or had a baby within the last month   (1 Point)  [ ] Post-partum < 6 weeks                                   (1 Point)  [ ] Hormonal therapy  or oral contraception   (1 Point)  [ ] History of pregnancy complications              (1 point)  [ ] Unexplained or recurrent              (1 Point)    OTHER RISK FACTORS                                           (1 Point)  [ x] BMI >40, smoking, diabetes requiring insulin, chemotherapy  blood transfusions and length of surgery over 2 hours    SURGERY RELATED RISK FACTORS  [ ]  Section within the last month     (1 Point)  [ ] Minor surgery                                                  (1 Point)  [ ] Arthroscopic surgery                                       (2 Points)  [ x] Planned major surgery lasting more            (2 Points)      than 45 minutes     [ ] Elective hip or knee joint replacement       (5 points)       surgery                                                TRAUMA RELATED RISK FACTORS  [ ] Fracture of the hip, pelvis, or leg                       (5 Points)  [ ] Spinal cord injury resulting in paralysis             (5 points)       (in the previous month)    [ ] Paralysis  (less than 1 month)                             (5 Points)  [ ] Multiple Trauma within 1 month                        (5 Points)    Total Score [    12    ]    Caprini Score 0-2: Low Risk, NO VTE prophylaxis required for most patients, encourage ambulation  Caprini Score 3-6: Moderate Risk , pharmacologic VTE prophylaxis is indicated for most patients (in the absence of contraindications)  Caprini Score Greater than or =7: High risk, pharmocologic VTE prophylaxis indicated for most patients (in the absence of contraindications)    OPIOID RISK TOOL    YOLIS EACH BOX THAT APPLIES AND ADD TOTALS AT THE END    FAMILY HISTORY OF SUBSTANCE ABUSE                 FEMALE         MALE                                                Alcohol                             [  ]1 pt          [  ]3pts                                               Illegal Durgs                     [  ]2 pts        [  ]3pts                                               Rx Drugs                           [  ]4 pts        [  ]4 pts    PERSONAL HISTORY OF SUBSTANCE ABUSE                                                                                          Alcohol                             [  ]3 pts       [  ]3 pts                                               Illegal Drugs                     [  ]4 pts        [  ]4 pts                                               Rx Drugs                           [  ]5 pts        [  ]5 pts    AGE BETWEEN 16-45 YEARS                                      [  ]1 pt         [  ]1 pt    HISTORY OF PREADOLESCENT   SEXUAL ABUSE                                                             [  ]3 pts        [  ]0pts    PSYCHOLOGICAL DISEASE                     ADD, OCD, Bipolar, Schizophrenia        [  ]2 pts         [  ]2 pts                      Depression                                               [  ]1 pt           [  ]1 pt           SCORING TOTAL   (add numbers and type here)              (*0**)                                     A score of 3 or lower indicated LOW risk for future opioid abuse  A score of 4 to 7 indicated moderate risk for future opioid abuse  A score of 8 or higher indicates a high risk for opioid abuse

## 2024-12-31 NOTE — H&P PST ADULT - EKG AND INTERPRETATION
recent ekg in chart NSR 65 BPM pulm disease pattern, incomplete RBBB left anterior fasicular block pending final read pending cardiac optimization

## 2024-12-31 NOTE — H&P PST ADULT - FUNCTIONAL STATUS
walking with cane, can climb 2 flights of stairs slowly, gardening METS 5 walking with walker/less than 4 METS

## 2024-12-31 NOTE — H&P PST ADULT - CARDIOVASCULAR
regular rate and rhythm/no murmur details… normal/regular rate and rhythm/S1 S2 present/no murmur/no JVD/no pedal edema

## 2024-12-31 NOTE — H&P PST ADULT - ENMT COMMENTS
Pt denies any loose teeth or dentures. Mallampati I Pt denies any loose teeth or dentures. glasses for reading and distance

## 2024-12-31 NOTE — H&P PST ADULT - GASTROINTESTINAL
normal/soft/nontender/nondistended/normal active bowel sounds negative normal/soft/nontender/nondistended/normal active bowel sounds/no guarding/no rigidity/no organomegaly/no palpable maxine/no masses palpable

## 2024-12-31 NOTE — H&P PST ADULT - NSICDXPASTMEDICALHX_GEN_ALL_CORE_FT
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

## 2024-12-31 NOTE — CHART NOTE - NSCHARTNOTEFT_GEN_A_CORE
Confidential Drug Utilization Report  Search Terms: harsha holloway, 1950Search Date: 12/31/2024 16:09:49 PM  The Drug Utilization Report below displays all of the controlled substance prescriptions, if any, that your patient has filled in the last twelve months. The information displayed on this report is compiled from pharmacy submissions to the Department, and accurately reflects the information as submitted by the pharmacies.    This report was requested by: Sayra Vicente | Reference #: [id number]    Practitioner Count: 1  Pharmacy Count: 1  Current Opioid Prescriptions: 0  Current Benzodiazepine Prescriptions: 0  Current Stimulant Prescriptions: 0      Patient Demographic Information (PDI)       PDI	First Name	Last Name	Birth Date	Gender	Street Address	[facility]	Zip Code  A	Harsha Holloway	1950	Male	[address] [facility]	[address]    Prescription Information      PDI Filter:    PDI	My Rx	Current Rx	Drug Type	Rx Written	Rx Dispensed	Drug	Quantity	Days Supply	Prescriber Name	Prescriber MAYA #	Payment Method	Dispenser  A	N	N	O	12/02/2024	12/02/2024	hydrocodone-acetaminophen 5-325 mg tablet	14	7	Maylin Lemon	[id number]	Medicare	[id number]  A	N	N	O	09/17/2024	09/20/2024	tramadol hcl 50 mg tablet	60	30	Aristeo Samuel MD	[id number]	Medicare	[id number]  A	N	N	O	07/22/2024	07/22/2024	oxycodone hcl (ir) 5 mg tablet	12	3	Ever Zhou (P A -C )	[id number]	Medicare	[id number]  A	N	N	O	03/28/2024	03/30/2024	tramadol hcl 50 mg tablet	60	30	Harsha White F	[id number]	Medicare	[id number]

## 2024-12-31 NOTE — H&P PST ADULT - PROBLEM SELECTOR PLAN 7
Cardiac optimization pending. Asked the patient to consult with PCP/cardiologist about holding ASA and the pt  agreed. Cardiac optimization pending.

## 2025-01-02 RX ORDER — POVIDONE IODINE USP, 10% W/W 10 MG/ML
1 SWAB TOPICAL ONCE
Refills: 0 | Status: COMPLETED | OUTPATIENT
Start: 2025-01-13 | End: 2025-01-13

## 2025-01-06 NOTE — PROVIDER CONTACT NOTE (OTHER) - ACTION/TREATMENT ORDERED:
Pt declined to answer any telephone calls to review program. Written class material given at [facility].  All questions answered, contact information  given.

## 2025-01-13 ENCOUNTER — APPOINTMENT (OUTPATIENT)
Dept: ORTHOPEDIC SURGERY | Facility: HOSPITAL | Age: 75
End: 2025-01-13

## 2025-01-13 ENCOUNTER — INPATIENT (INPATIENT)
Facility: HOSPITAL | Age: 75
LOS: 3 days | Discharge: HOME HEALTH | DRG: 552 | End: 2025-01-17
Attending: ORTHOPAEDIC SURGERY | Admitting: ORTHOPAEDIC SURGERY
Payer: COMMERCIAL

## 2025-01-13 ENCOUNTER — TRANSCRIPTION ENCOUNTER (OUTPATIENT)
Age: 75
End: 2025-01-13

## 2025-01-13 VITALS
SYSTOLIC BLOOD PRESSURE: 109 MMHG | TEMPERATURE: 98 F | WEIGHT: 160.94 LBS | DIASTOLIC BLOOD PRESSURE: 64 MMHG | HEIGHT: 66 IN | OXYGEN SATURATION: 98 % | HEART RATE: 66 BPM | RESPIRATION RATE: 16 BRPM

## 2025-01-13 DIAGNOSIS — M48.062 SPINAL STENOSIS, LUMBAR REGION WITH NEUROGENIC CLAUDICATION: ICD-10-CM

## 2025-01-13 DIAGNOSIS — Z98.890 OTHER SPECIFIED POSTPROCEDURAL STATES: Chronic | ICD-10-CM

## 2025-01-13 DIAGNOSIS — Z98.89 OTHER SPECIFIED POSTPROCEDURAL STATES: Chronic | ICD-10-CM

## 2025-01-13 DIAGNOSIS — Z95.5 PRESENCE OF CORONARY ANGIOPLASTY IMPLANT AND GRAFT: Chronic | ICD-10-CM

## 2025-01-13 LAB — ABO RH CONFIRMATION: SIGNIFICANT CHANGE UP

## 2025-01-13 PROCEDURE — 63047 LAM FACETEC & FORAMOT LUMBAR: CPT

## 2025-01-13 PROCEDURE — 63048 LAM FACETEC &FORAMOT EA ADDL: CPT | Mod: AS

## 2025-01-13 PROCEDURE — 63047 LAM FACETEC & FORAMOT LUMBAR: CPT | Mod: AS

## 2025-01-13 PROCEDURE — 99232 SBSQ HOSP IP/OBS MODERATE 35: CPT

## 2025-01-13 PROCEDURE — 63048 LAM FACETEC &FORAMOT EA ADDL: CPT

## 2025-01-13 DEVICE — SURGIFLO MATRIX WITH THROMBIN KIT: Type: IMPLANTABLE DEVICE | Status: FUNCTIONAL

## 2025-01-13 DEVICE — SURGIFOAM PAD 8CM X 12.5CM X 10MM (100): Type: IMPLANTABLE DEVICE | Status: FUNCTIONAL

## 2025-01-13 RX ORDER — GABAPENTIN 300 MG/1
300 CAPSULE ORAL EVERY 8 HOURS
Refills: 0 | Status: DISCONTINUED | OUTPATIENT
Start: 2025-01-13 | End: 2025-01-17

## 2025-01-13 RX ORDER — ONDANSETRON 4 MG/1
4 TABLET ORAL EVERY 6 HOURS
Refills: 0 | Status: DISCONTINUED | OUTPATIENT
Start: 2025-01-13 | End: 2025-01-17

## 2025-01-13 RX ORDER — ATORVASTATIN CALCIUM 40 MG/1
40 TABLET, FILM COATED ORAL AT BEDTIME
Refills: 0 | Status: DISCONTINUED | OUTPATIENT
Start: 2025-01-13 | End: 2025-01-17

## 2025-01-13 RX ORDER — KETOROLAC TROMETHAMINE 30 MG/ML
30 INJECTION INTRAMUSCULAR; INTRAVENOUS EVERY 6 HOURS
Refills: 0 | Status: DISCONTINUED | OUTPATIENT
Start: 2025-01-13 | End: 2025-01-13

## 2025-01-13 RX ORDER — APREPITANT 40 MG/1
40 CAPSULE ORAL ONCE
Refills: 0 | Status: COMPLETED | OUTPATIENT
Start: 2025-01-13 | End: 2025-01-13

## 2025-01-13 RX ORDER — METOPROLOL TARTRATE 50 MG
25 TABLET ORAL DAILY
Refills: 0 | Status: DISCONTINUED | OUTPATIENT
Start: 2025-01-14 | End: 2025-01-14

## 2025-01-13 RX ORDER — CELECOXIB 200 MG
200 CAPSULE ORAL EVERY 12 HOURS
Refills: 0 | Status: DISCONTINUED | OUTPATIENT
Start: 2025-01-14 | End: 2025-01-17

## 2025-01-13 RX ORDER — CEFAZOLIN SODIUM 1 G
2000 VIAL (EA) INJECTION
Refills: 0 | Status: COMPLETED | OUTPATIENT
Start: 2025-01-13 | End: 2025-01-13

## 2025-01-13 RX ORDER — ACETAMINOPHEN 80 MG/.8ML
975 SOLUTION/ DROPS ORAL EVERY 8 HOURS
Refills: 0 | Status: DISCONTINUED | OUTPATIENT
Start: 2025-01-13 | End: 2025-01-17

## 2025-01-13 RX ORDER — ACETAMINOPHEN 80 MG/.8ML
975 SOLUTION/ DROPS ORAL ONCE
Refills: 0 | Status: COMPLETED | OUTPATIENT
Start: 2025-01-13 | End: 2025-01-13

## 2025-01-13 RX ORDER — FENTANYL 75 UG/H
30 PATCH, EXTENDED RELEASE TRANSDERMAL
Refills: 0 | Status: DISCONTINUED | OUTPATIENT
Start: 2025-01-13 | End: 2025-01-14

## 2025-01-13 RX ORDER — FENTANYL 75 UG/H
25 PATCH, EXTENDED RELEASE TRANSDERMAL
Refills: 0 | Status: DISCONTINUED | OUTPATIENT
Start: 2025-01-13 | End: 2025-01-13

## 2025-01-13 RX ORDER — SODIUM CHLORIDE 9 MG/ML
3 INJECTION, SOLUTION INTRAMUSCULAR; INTRAVENOUS; SUBCUTANEOUS EVERY 8 HOURS
Refills: 0 | Status: DISCONTINUED | OUTPATIENT
Start: 2025-01-13 | End: 2025-01-13

## 2025-01-13 RX ORDER — METHOCARBAMOL 500 MG
500 TABLET ORAL EVERY 8 HOURS
Refills: 0 | Status: DISCONTINUED | OUTPATIENT
Start: 2025-01-13 | End: 2025-01-17

## 2025-01-13 RX ORDER — HYDROMORPHONE HCL 4 MG
0.25 TABLET ORAL
Refills: 0 | Status: DISCONTINUED | OUTPATIENT
Start: 2025-01-13 | End: 2025-01-13

## 2025-01-13 RX ORDER — PANTOPRAZOLE 40 MG/1
40 TABLET, DELAYED RELEASE ORAL
Refills: 0 | Status: DISCONTINUED | OUTPATIENT
Start: 2025-01-13 | End: 2025-01-17

## 2025-01-13 RX ORDER — CEFAZOLIN SODIUM 1 G
2000 VIAL (EA) INJECTION ONCE
Refills: 0 | Status: DISCONTINUED | OUTPATIENT
Start: 2025-01-13 | End: 2025-01-13

## 2025-01-13 RX ORDER — ONDANSETRON 4 MG/1
4 TABLET ORAL ONCE
Refills: 0 | Status: DISCONTINUED | OUTPATIENT
Start: 2025-01-13 | End: 2025-01-13

## 2025-01-13 RX ORDER — CELECOXIB 200 MG
200 CAPSULE ORAL ONCE
Refills: 0 | Status: COMPLETED | OUTPATIENT
Start: 2025-01-13 | End: 2025-01-13

## 2025-01-13 RX ORDER — SENNOSIDES 8.6 MG/1
2 TABLET, FILM COATED ORAL AT BEDTIME
Refills: 0 | Status: DISCONTINUED | OUTPATIENT
Start: 2025-01-13 | End: 2025-01-17

## 2025-01-13 RX ORDER — MAG HYDROX/ALUMINUM HYD/SIMETH 200-200-20
30 SUSPENSION, ORAL (FINAL DOSE FORM) ORAL EVERY 12 HOURS
Refills: 0 | Status: DISCONTINUED | OUTPATIENT
Start: 2025-01-13 | End: 2025-01-17

## 2025-01-13 RX ORDER — SODIUM CHLORIDE 9 MG/ML
1000 INJECTION, SOLUTION INTRAVENOUS
Refills: 0 | Status: DISCONTINUED | OUTPATIENT
Start: 2025-01-13 | End: 2025-01-17

## 2025-01-13 RX ORDER — MAGNESIUM HYDROXIDE 400 MG/5ML
30 SUSPENSION, ORAL (FINAL DOSE FORM) ORAL EVERY 12 HOURS
Refills: 0 | Status: DISCONTINUED | OUTPATIENT
Start: 2025-01-13 | End: 2025-01-17

## 2025-01-13 RX ORDER — ENOXAPARIN SODIUM 60 MG/.6ML
40 INJECTION INTRAVENOUS; SUBCUTANEOUS EVERY 24 HOURS
Refills: 0 | Status: DISCONTINUED | OUTPATIENT
Start: 2025-01-14 | End: 2025-01-17

## 2025-01-13 RX ORDER — NALOXONE HCL 0.4 MG/ML
0.1 VIAL (ML) INJECTION
Refills: 0 | Status: DISCONTINUED | OUTPATIENT
Start: 2025-01-13 | End: 2025-01-17

## 2025-01-13 RX ADMIN — GABAPENTIN 300 MILLIGRAM(S): 300 CAPSULE ORAL at 15:53

## 2025-01-13 RX ADMIN — ACETAMINOPHEN 975 MILLIGRAM(S): 80 SOLUTION/ DROPS ORAL at 06:54

## 2025-01-13 RX ADMIN — FENTANYL 30 MILLILITER(S): 75 PATCH, EXTENDED RELEASE TRANSDERMAL at 12:00

## 2025-01-13 RX ADMIN — ATORVASTATIN CALCIUM 40 MILLIGRAM(S): 40 TABLET, FILM COATED ORAL at 22:08

## 2025-01-13 RX ADMIN — Medication 0.25 MILLIGRAM(S): at 11:28

## 2025-01-13 RX ADMIN — ACETAMINOPHEN 975 MILLIGRAM(S): 80 SOLUTION/ DROPS ORAL at 22:07

## 2025-01-13 RX ADMIN — APREPITANT 40 MILLIGRAM(S): 40 CAPSULE ORAL at 06:54

## 2025-01-13 RX ADMIN — Medication 2000 MILLIGRAM(S): at 22:09

## 2025-01-13 RX ADMIN — Medication 200 MILLIGRAM(S): at 06:54

## 2025-01-13 RX ADMIN — Medication 500 MILLIGRAM(S): at 22:07

## 2025-01-13 RX ADMIN — Medication 0.25 MILLIGRAM(S): at 11:58

## 2025-01-13 RX ADMIN — KETOROLAC TROMETHAMINE 30 MILLIGRAM(S): 30 INJECTION INTRAMUSCULAR; INTRAVENOUS at 15:54

## 2025-01-13 RX ADMIN — ACETAMINOPHEN 975 MILLIGRAM(S): 80 SOLUTION/ DROPS ORAL at 16:15

## 2025-01-13 RX ADMIN — GABAPENTIN 300 MILLIGRAM(S): 300 CAPSULE ORAL at 22:08

## 2025-01-13 RX ADMIN — Medication 500 MILLIGRAM(S): at 15:54

## 2025-01-13 RX ADMIN — ACETAMINOPHEN 975 MILLIGRAM(S): 80 SOLUTION/ DROPS ORAL at 15:54

## 2025-01-13 RX ADMIN — FENTANYL 30 MILLILITER(S): 75 PATCH, EXTENDED RELEASE TRANSDERMAL at 19:25

## 2025-01-13 RX ADMIN — SENNOSIDES 2 TABLET(S): 8.6 TABLET, FILM COATED ORAL at 22:08

## 2025-01-13 RX ADMIN — Medication 2000 MILLIGRAM(S): at 15:58

## 2025-01-13 RX ADMIN — ACETAMINOPHEN 975 MILLIGRAM(S): 80 SOLUTION/ DROPS ORAL at 23:07

## 2025-01-13 RX ADMIN — POVIDONE IODINE USP, 10% W/W 1 APPLICATION(S): 10 SWAB TOPICAL at 06:58

## 2025-01-13 RX ADMIN — SODIUM CHLORIDE 80 MILLILITER(S): 9 INJECTION, SOLUTION INTRAVENOUS at 22:11

## 2025-01-13 RX ADMIN — KETOROLAC TROMETHAMINE 30 MILLIGRAM(S): 30 INJECTION INTRAMUSCULAR; INTRAVENOUS at 16:15

## 2025-01-13 NOTE — DISCHARGE NOTE PROVIDER - HOSPITAL COURSE
Patient was admitted to [facility] on 1/13/25 for elective L2, L3, L4, L5 laminectomy to address neurogenic claudication/leg pain from spinal canal stenosis.  Surgery went as planned and patient will remail at [facility] until he is able to accomplish his ADLs, pain is controlled.  He does have history of narcotic dependence so pain management was consulted for inpatient pain control recommendations as well as pain control on discharge.  He should follow up with his pain management physician, Dr Clifford Marley within 7 days of discharge for adequate pain control.  Due to history of DVT and melanoma, lovenox 40 mg once daily x 28 days post op then ASA 81 mg once a day for dvtp.  Patient activity is WBAT, he does not require bracing. Patient was admitted to [facility] on 1/13/25 for elective L2, L3, L4, L5 laminectomy to address neurogenic claudication/leg pain from spinal canal stenosis.  Surgery went as planned and patient will remail at [facility] until he is able to accomplish his ADLs, pain is controlled.  He does have history of narcotic dependence so pain management was consulted for inpatient pain control recommendations as well as pain control on discharge.  He should follow up with his pain management physician, Dr Clifford Marley within 7 days of discharge for adequate pain control.  Due to history of DVT and melanoma, lovenox 40 mg once daily x 28 days post op then ASA 81 mg once a day for dvtp. Patient was followed up by Electrophysiology and cardiology while in stay for pauses and SVT that were noted on heart monitor.  Patient activity is WBAT, he does not require bracing.

## 2025-01-13 NOTE — BRIEF OPERATIVE NOTE - NSICDXBRIEFPROCEDURE_GEN_ALL_CORE_FT
PROCEDURES:  Laminectomy of lumbar spine at 3 or more levels for stenosis 13-Jan-2025 11:02:20  Kalyn Lieberman  
PROCEDURES:  Laminectomy of lumbar spine at 3 or more levels for stenosis 13-Jan-2025 11:02:20  Kalyn Lieberman

## 2025-01-13 NOTE — DISCHARGE NOTE PROVIDER - NSDCFUADDINST_GEN_ALL_CORE_FT
Do not drive or operate heavy machinery within 8 hours of taking narcotic medication or muscle relaxers.  You can shower from the front starting post op day 5.  Leave your dressing intact until your post operative visit on 1/21/25. Please make an appointment for follow up with your surgeon in 1-2 weeks. Call to schedule an appointment. Staples or stitches will be removed at your follow up appointment. Keep incision site clean and dry. No creams, lotions, or ointments to incision area. You are cleared to shower 3 days after surgery unless otherwise instructed.    Take pain medication as prescribed after surgery for pain control. Contact your surgeon's office if pain increases while taking prescribed pain medications or if related concerns develop. If you are taking narcotic pain medications, take a stool softener with it to prevent narcotic associated constipation. Additionally, increase water intake (drink at least 8 glasses daily) and add fiber to your diet by eating fruits, vegetables and foods that are rich in grains.     Do NOT take NSAIDs, including ibuprofen, Advil, Aleve, and Motrin for at least 3 months after your surgery as these medications can impact your healing.    NO heavy lifting, strenuous activity, twisting, bending, driving, or working until cleared by your surgeon  Contact your surgeon's office immediately for any of the following: fever, bleeding, new onset numbness/tingling/weakness, nausea and/or vomiting, urinary and/or fecal incontinence or retention. If an emergency occurs (chest pain, shortness of breath, new confusion, seizure, altered mental status, or other), call 911 and go to the emergency department for evaluation.    Patient will continue to take Lovenox for 28 days for DVTp, then continue home dose aspirin     Patient will follow up with Dr. Velasquez for further cardiology management

## 2025-01-13 NOTE — DISCHARGE NOTE PROVIDER - NSDCCPTREATMENT_GEN_ALL_CORE_FT
PRINCIPAL PROCEDURE  Procedure: Laminectomy, spine, lumbar, 4 or more levels  Findings and Treatment:

## 2025-01-13 NOTE — DISCHARGE NOTE PROVIDER - PROVIDER TOKENS
PROVIDER:[TOKEN:[[id number]]] PROVIDER:[TOKEN:[[id number]]],PROVIDER:[TOKEN:[[id number]]]

## 2025-01-13 NOTE — CONSULT NOTE ADULT - ASSESSMENT
74-year-old male presents to PST with PMH of CAD 2 stents, extrophy of bladder, s/p 30 urological surgeries, encephalopathy, s/p urostomy placed 2015, melanoma s/p removal x2 on neck and right cheek, who presents to [facility] for a scheduled  L2-L5 laminectomy. Pain management was consulted for ...    Plan: 74-year-old male presents to PST with PMH of CAD 2 stents, extrophy of bladder, s/p 30 urological surgeries, encephalopathy, s/p urostomy placed 2015, melanoma s/p removal x2 on neck and right cheek, who presents to [facility] for a scheduled  L2-L5 laminectomy. Pain management was consulted for pain control optimization.    Plan:  - DC Fentanyl PCA  - Recommend starting oxycodone PO 5mg/10mg [id number] PRN mod/severe pain  - Recommend starting hydromorphone IVP 0.5mg q3hour PRN breakthrough pain  - Recommend starting acetaminophen PO 975mg [id number] standing. HOLD for liver function test dysfunction  - If no contraindication to NSAIDs, recommend starting ketorolac IV 15mg [id number] standing x 4 doses. Afterwards, can use celebrex 200mg PO q12h x 5days, with food. HOLD for black/red stools.  - Recommend starting robaxin 500mg TID standing. HOLD for sedation  - lidocaine   Patch 12 hours on, 12 hours off. Max 3 patches on at one time

## 2025-01-13 NOTE — DISCHARGE NOTE PROVIDER - CARE PROVIDER_API CALL
Pollo Guzman  Spine Surgery  [address] [place] [address]  Phone: [phone]  Fax: [phone]  Follow Up Time:    Pollo Guzman  Spine Surgery  [address] [place] [address]  Phone: [phone]  Fax: [phone]  Follow Up Time:     Kishor Velasquez  Cardiovascular Disease  [address] [facility], [address]  [place] [address]  Phone: [phone]  Fax: [phone]  Follow Up Time:

## 2025-01-13 NOTE — BRIEF OPERATIVE NOTE - OPERATION/FINDINGS
General anesthesia was induced and then I personally assisted all aspects of positioning prone for a posterior lumbar approach on a modular Quincy table.  Lumbar spine was cleansed with Betadine by me and subsequently cleansed with DuraPrep by RN.   I participated in positioning of blue drapes and ioban and participated in operative timeout. A longitudinal incision was based over the caudal aspect of the L2 vertebral body to the cephalad aspect of the L5. I was responsible for sequential use of hand-held retractors as well as cerebellar retractors to gain access to the spinous processes.  I assisted in dissection to the spinous processes to medial borders of the corresponding facets and bilateral lamina at L3, L4, partial L5 on patient's right side at which time fluoroscopic imaging confirmed the operative site again.   I then assisted with Laminectomy at  L2,L3, L4,L5.  I provided visualization and assisted with hemostasis throughout the procedure by using sequential retraction, continuous suction, utilization of Surgi-Lazaro and packing with Ray-Jess, Bovie cauterization.  I made incision for drain cranial to surgical incision, used hemostat to pull drain through and placed drain appropriately in the lumbar area. The surgical area was irrigated copiously, prophylactic  valsalva maneuver was done without any evidence of durotomy.  Vanco powder 1 gm placed in the incision.  I personally assisted in Deep fascial closure was conducted with 0 Vicryl.  I personally performed closure of superficial fascia with 2-0 Vicryl, Monocryl, Dermabond, longitudinal Steri-Strips. I placed drain stitch.  Surgical site was cleansed with normal saline dried and a dry sterile dressing in the form of Mepilex was placed over the surgical incision.  xeroform, 2 x 2, Tegaderm was placed over the drain site.   
severe spinal stenosis

## 2025-01-13 NOTE — DISCHARGE NOTE PROVIDER - CARE PROVIDERS DIRECT ADDRESSES
[email or web address]

## 2025-01-13 NOTE — CONSULT NOTE ADULT - SUBJECTIVE AND OBJECTIVE BOX
73 y/o male with Hx of CAD 2 stents, extrophy of bladder, s/p 30 urological surgeries, encephalopathy, s/p urostomy placed 2015, melanoma s/p removal x2 on neck and right cheek, he has lower back pain severely worsening sine thanksgiving, he has had chronic lower back pain for several years. He is seeing pain management Dr. Marley for this who has provided him with multiple medications including narcotics oxycodone tramadol hydrocodone etc. He began to experience excruciating pain in the right lower extremity L5/S1 distribution, Patient has had an epidural injections and this has improved symptoms, he has Hx of posterior cervical laminectomy fusion for what sounds to be a cervical myelopathy. He has had residual numbness tingling in the bilateral lower extremities which is only moderately improved and is more intermittent in nature, He does have some difficulties with balance but again this has been chronic in nature long-term since previous cervical fusion. He only find improvement in previous injection by Dr. Marley for approximately 24 hours and then pain subsequently returned, he has lower back pain but primarily right lower extremity radiculopathy, he is unable to weight-bear/stand for prolonged period of time secondary to worsening pain. Unable to walk for prolonged periods, he came in here for elective L2-L5 laminectomy on 1/13/2025 with Dr. Guzman s/p procedure, his pain is well controlled, denies fever, chills, chest pain, sob.     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     PAST SURGICAL HISTORY:  H/O laminectomy     History of bladder surgery     History of urinary diversion procedure with urostomy  April 2014 with bag    Stented coronary artery.     FAMILY HISTORY:  Father  Still living? Unknown  Family history of acute lymphoid leukemia, Age at diagnosis: Age Unknown.      Social History:   No a smoker, drinker or using any drugs       Home Medications:   * Patient Currently Takes Medications as of 31-Dec-2024 12:47 documented in Structured Notes  · 	metoprolol succinate 25 mg oral tablet, extended release: Last Dose Taken:  , 1 tab(s) orally once a day (at bedtime)  · 	aspirin 81 mg oral tablet: Last Dose Taken:  , 1 tab(s) orally once a day  · 	methocarbamol 500 mg oral tablet: Last Dose Taken:  , 2 tab(s) orally as needed for  moderate pain 1-0.5mg tablet prn for spasms  · 	gabapentin 300 mg oral capsule: Last Dose Taken:  , 1 cap(s) orally 2 times a day  · 	traMADol 50 mg oral tablet: Last Dose Taken:  , 1 tab(s) orally as needed for  severe pain 1-0.5 tablets once daily PRN  · 	atorvastatin 40 mg oral tablet: Last Dose Taken:  , 1 tab(s) orally once a day (at bedtime)      Vital Signs Last 24 Hrs  T(C): 36.7 (13 Jan 2025 13:03), Max: 36.7 (13 Jan 2025 13:03)  T(F): 98 (13 Jan 2025 13:03), Max: 98 (13 Jan 2025 13:03)  HR: 64 (13 Jan 2025 13:03) (64 - 78)  BP: 105/67 (13 Jan 2025 13:03) (102/66 - 117/75)  BP(mean): 78 (13 Jan 2025 12:15) (78 - 86)  RR: 18 (13 Jan 2025 13:03) (10 - 18)  SpO2: 91% (13 Jan 2025 13:03) (91% - 98%)    Parameters below as of 13 Jan 2025 13:03  Patient On (Oxygen Delivery Method): room air        PHYSICAL EXAM:    GENERAL: Elderly male looking comfortable   HEENT: PERRL, +EOMI  NECK: soft, Supple, No JVD   CHEST/LUNG: Clear to auscultate bilaterally; No wheezing  HEART: S1S2+, Regular rate and rhythm; No murmurs  ABDOMEN: Soft, Nontender, Nondistended; Bowel sounds present  EXTREMITIES:  1+ Peripheral Pulses, No edema  SKIN: No rashes or lesions  NEURO: AAOX3  PSYCH: normal mood  lower back: clean dressings on, has drain in place

## 2025-01-13 NOTE — BRIEF OPERATIVE NOTE - ANTIBIOTIC PROTOCOL
Followed protocol
Followed protocol
54 year old heterosexual cisgender male, single, domiciled with a friend, unemployed (receives SSI), with PMH of GERD, Diabetes mellitus type 2, HTN, history of pituitary tumor and PPH bipolar 1 disorder, antisocial personality disorder, conduct disorder, polysubstance use disorder (opioid, alcohol, tobacco, cocaine, amphetamine, cannabis) history of one remote suicide attempt via injecting bleach per patient, history of multiple inpatient psychiatric hospitalizations most recently at [facility] (23 – 23) for Brief Psychotic Disorder, [facility] (23 – 23) for Bipolar Disorder Unspecified, well documented history of multiple ED visits for medical, mental health and substance use disorder, history of incarceration (served 8 year sentence for manslaughter and was released from [facility] in ), currently on methadone maintenance through [facility] [place], +family history (father  of drug/alcohol overdose, and half sister has schizophrenia), BIB self reporting homicidal ideation, suicidal ideation and paranoia in the context of substance use and medication non-adherence.     Pt has multiple chronic non-modifiable risk factors for both violence and suicide including forensic history, history of aggression on in-patient units, diagnoses of bipolar disorder and antisocial personality disorder, low SES, history of TBI, among others. On presentation, he had modifiable risk factor of stopping lithium two weeks prior due to tremor. Pt was hospitalized as acute risk of violence was elevated due to increased impulsivity and mood lability in context of undertreatment. Pt was started on Depakote with improvement in mood lability and impulsivity. Of note, pt also has modifiable risk factor of active substance use disorder. Opioid use is currently being addressed with methadone. Referred pt to several rehabilitation programs however he was not eligible due to forensic/aggression history and current Valium prescription. He was not amenable to decreasing valium during this hospitalization. Pt has not been aggressive during this hospitalization. His risk of suicide and aggression have returned to baseline and now plan for discharge tomorrow.       Plan:   - Voluntary Admission to [facility]  - Continue home Methadone 130 mg PO daily  - Continue home diazepam 10 mg PO TID  - Continue Depakote  mg PO BID for mood stabilization  - risperidone 1mg nightly for psychosis (plan to titrate up as tolerated)  - discontinue mirtazapine (Remeron) 15 mg PO qHs PRN for insomnia  - Continue home Atorvastatin Calcium 40 mg PO daily, Carvedilol 6.25 mg PO BID, Lisinopril 10 mg PO daily  - Continue sliding scale insulin lispro for DM2  - Continue Nicotine 21 mg/24h patch for NRT  - Endocrinology consult   - PRNs: Zyprexa 5-10mg q6 hrs po PRN or IM for agitation/severe agitation. Patient reporting possible heart failure so would avoid haldol for now. Can also use Ativan 2mg po PRN for severe agitation. Avoid concomitant IV/IM benzo use if he is given zyprexa IV/IM for at least 4 hours.  - Patient was Safe ACT'd in 2022       Problem: Type 2 diabetes mellitus.   ·  Recommendation: Type 2 diabetes mellitus with hyperglycemia  - lantus 16 units at bedtime.   - Start lispro 12 units before each meal.  - Consistent carb diet. Discussed with patient the difference between regular and consistent carb diet, specifically sweetened beverages and he was agreeable for consistent carb at time of visit.  - Continue lispro moderate dose sliding scale before meals and at bedtime.  - Patient's fingerstick glucose goal is 100-180 mg/dL.    - For discharge, patient can continue basal/bolus, doses TBD. He uses vial and syringe. He declines trying any oral medications, despite the fact that it would decrease disease management burden which was discussed.  - Patient can follow up at discharge with [facility] by calling [phone] to make an appointment.

## 2025-01-13 NOTE — CONSULT NOTE ADULT - SUBJECTIVE AND OBJECTIVE BOX
74-year-old male presents to PST with PMH of CAD 2 stents, extrophy of bladder, s/p 30 urological surgeries, encephalopathy, s/p urostomy placed 2015, melanoma s/p removal x2 on neck and right cheek. Patient c/o lower back pain severely worsening sine thanksgiving. Patient states that he has had chronic lower back pain for several years. He is seeing pain management Dr. Marley for this who has provided him with multiple medications including narcotics oxycodone tramadol hydrocodone etc. He began to experience excruciating pain in the right lower extremity L5/S1 distribution. Pain is described as dull aching excruciating 10/10 on pain scale. Range of pain 2-10/10 throughout the day. He was unable to weight-bear or ambulate for prolonged periods of time secondary severe pain. He ultimately had to use a walker to get around. Patient has had an epidural injections and this has improved symptoms. He is now ambulating with the aid of a walker. He does have a history of a posterior cervical laminectomy fusion for what sounds to be a cervical myelopathy. He has had residual numbness tingling in the bilateral lower extremities which is only moderately improved and is more intermittent in nature. He also has significant weakness/difficulty of moving the right lower extremity which again has been long-term for several years. He describes it as the right lower extremity feeling is heavier than the left lower extremity. No bowel/bladder incontinence. No saddle anesthesia. He does have some difficulties with balance but again this has been chronic in nature long-term since previous cervical fusion. He only find improvement in previous injection by Dr. Marley for approximately 24 hours and then pain subsequently returned. He describes some lower back pain but primarily right lower extremity radiculopathy. He states he is unable to weight-bear/stand for prolonged period of time secondary to worsening pain. Unable to walk for prolonged periods. Patient scheduled for L2-L5 laminectomy on 1/13/2025 with Dr. Guzman.     Interval Hx:  Patient seen during rounds  Patient reports pain to be controlled on current medications  Patient denies sedation with medications        Analgesic Dosing for past 24 hours reviewed as below:  acetaminophen     Tablet ..   975 milliGRAM(s) Oral (01-13-25 @ 06:54)    aprepitant   40 milliGRAM(s) Oral (01-13-25 @ 06:54)    celecoxib   200 milliGRAM(s) Oral (01-13-25 @ 06:54)          T(C): 36.6 (01-13-25 @ 06:11), Max: 36.6 (01-13-25 @ 06:11)  HR: 66 (01-13-25 @ 06:11) (66 - 66)  BP: 109/64 (01-13-25 @ 06:11) (109/64 - 109/64)  RR: 16 (01-13-25 @ 06:11) (16 - 16)  SpO2: 98% (01-13-25 @ 06:11) (98% - 98%)        ceFAZolin  Injectable. 2000 milliGRAM(s) IV Push once  sodium chloride 0.9% lock flush 3 milliLiter(s) IV Push every 8 hours                  Pain Service   [phone]

## 2025-01-13 NOTE — BRIEF OPERATIVE NOTE - NSICDXBRIEFPREOP_GEN_ALL_CORE_FT
PRE-OP DIAGNOSIS:  Lumbosacral stenosis with neurogenic claudication 13-Jan-2025 07:12:32  Kalyn Lieberman  
PRE-OP DIAGNOSIS:  Lumbosacral stenosis with neurogenic claudication 13-Jan-2025 07:12:32  Kalyn Lieberman

## 2025-01-13 NOTE — DISCHARGE NOTE PROVIDER - NSDCCPCAREPLAN_GEN_ALL_CORE_FT
PRINCIPAL DISCHARGE DIAGNOSIS  Diagnosis: Lumbar stenosis with neurogenic claudication  Assessment and Plan of Treatment:

## 2025-01-13 NOTE — CONSULT NOTE ADULT - ASSESSMENT
75 y/o male with Hx of CAD 2 stents, extrophy of bladder, s/p 30 urological surgeries, encephalopathy, s/p urostomy placed 2015, melanoma s/p removal x2 on neck and right cheek, he has lower back pain severely worsening sine thanksgiving, he has had chronic lower back pain for several years. He is seeing pain management Dr. Marley for this who has provided him with multiple medications including narcotics oxycodone tramadol hydrocodone etc. He began to experience excruciating pain in the right lower extremity L5/S1 distribution, Patient has had an epidural injections and this has improved symptoms, he has Hx of posterior cervical laminectomy fusion for what sounds to be a cervical myelopathy. He has had residual numbness tingling in the bilateral lower extremities which is only moderately improved and is more intermittent in nature, He does have some difficulties with balance but again this has been chronic in nature long-term since previous cervical fusion. He only find improvement in previous injection by Dr. Marley for approximately 24 hours and then pain subsequently returned, he has lower back pain but primarily right lower extremity radiculopathy, he is unable to weight-bear/stand for prolonged period of time secondary to worsening pain. Unable to walk for prolonged periods, he came in here for elective L2-L5 laminectomy on 1/13/2025 with Dr. Guzman s/p procedure.     Plan:     Lower back pain s/p  L2-L5 laminectomy post op day 0:     - post op no complications  - VS stable  - abx per ortho   - c/w anti hypertensive to be restarted post op day 02 except if blood pressure goes >150 systolic   - c/w IVF x 24 hrs then reassess per ortho  - opiate induced constipation regimen   - encouraging incentive spirometry   -c/w local wound care per ortho   -DVT prophylaxis and Pain meds as per Ortho team   -PT/OT and weight bearing per ortho   -methocarbamol 500 mg 2 tab(s) orally as needed for  moderate pain 1-0.5mg tablet prn for spasms  -gabapentin 300 mg 2 times a     HTN: will continue with metoprolol succinate 25 mg once a day (at bedtime)    Hx of CAD: will continue with aspirin 81 mg once a day.     Hx of HLD: will continue with atorvastatin 40 mg once a day (at bedtime).

## 2025-01-13 NOTE — PATIENT PROFILE ADULT - FALL HARM RISK - HARM RISK INTERVENTIONS
Assistance with ambulation/Assistance OOB with selected safe patient handling equipment/Communicate Risk of Fall with Harm to all staff/Discuss with provider need for PT consult/Monitor gait and stability/Provide patient with walking aids - walker, cane, crutches/Reinforce activity limits and safety measures with patient and family/Sit up slowly, dangle for a short time, stand at bedside before walking/Tailored Fall Risk Interventions/Use of alarms - bed, chair and/or voice tab/Visual Cue: Yellow wristband and red socks/Bed in lowest position, wheels locked, appropriate side rails in place/Call bell, personal items and telephone in reach/Instruct patient to call for assistance before getting out of bed or chair/Non-slip footwear when patient is out of bed/[place] to call system/Physically safe environment - no spills, clutter or unnecessary equipment/Purposeful Proactive Rounding/Room/bathroom lighting operational, light cord in reach

## 2025-01-13 NOTE — PROGRESS NOTE ADULT - SUBJECTIVE AND OBJECTIVE BOX
YOSELIN [id number]    74yMale  Spinal stenosis of lumbar region with neurogenic claudication    Family history of acute lymphoid leukemia (Father)    Handoff    CAD (coronary artery disease)    Congenital extroversion of urinary bladder    Ureterostomy status    Encephalopathy in sepsis    Constipation    CAD (coronary artery disease)    H/O neuropathy    Hyperlipidemia    DVT, lower extremity    HTN (hypertension)    Lumbar stenosis with neurogenic claudication    Lumbosacral stenosis with neurogenic claudication    Spinal stenosis of lumbar region    Spinal stenosis, lumbar region with neurogenic claudication    Need for prophylactic measure    History of urostomy    HTN (hypertension)    CAD (coronary artery disease)    DVT, lower extremity    H/O neuropathy    Laminectomy of lumbar spine at 3 or more levels for stenosis    History of urinary diversion procedure    H/O laminectomy    History of bladder surgery    Stented coronary artery    SPINAL STENOSIS, LUMBAR REGION    SysAdmin_VstLnk    Home Medications:  aspirin 81 mg oral tablet: 1 tab(s) orally once a day (13 Jan 2025 06:44)  atorvastatin 40 mg oral tablet: 1 tab(s) orally once a day (at bedtime) (13 Jan 2025 06:44)  gabapentin 300 mg oral capsule: 1 cap(s) orally 2 times a day (13 Jan 2025 06:44)  HYDROcodone 50 mg oral capsule, extended release: 1 cap(s) orally (13 Jan 2025 06:43)  immunity support gummie once daily:  (13 Jan 2025 06:44)  methocarbamol 500 mg oral tablet: 2 tab(s) orally as needed for  moderate pain 1-0.5mg tablet prn for spasms (13 Jan 2025 06:44)  metoprolol succinate 25 mg oral tablet, extended release: 1 tab(s) orally once a day (at bedtime) (13 Jan 2025 06:44)  multivitamin once daily:  (13 Jan 2025 06:44)  traMADol 50 mg oral tablet: 1 tab(s) orally as needed for  severe pain 1-0.5 tablets once daily PRN (13 Jan 2025 06:44)  vitamin B12 once daily:  (13 Jan 2025 06:44)  vitamin C once daily:  (13 Jan 2025 06:44)  vitamin D once daily:  (13 Jan 2025 06:44)    STATUS POST:  lumbar laminectomyL2 through L5 for lumbar stenosis with neurogenic claudication  SUBJECTIVE: Patient seen and examined doing well    Back Pain controlled, lower extremity pain resolving    OBJECTIVE:   T(C): 36.6 (01-13-25 @ 06:11), Max: 36.6 (01-13-25 @ 06:11)  HR: 66 (01-13-25 @ 06:11) (66 - 66)  BP: 109/64 (01-13-25 @ 06:11) (109/64 - 109/64)  RR: 16 (01-13-25 @ 06:11) (16 - 16)  SpO2: 98% (01-13-25 @ 06:11) (98% - 98%)   Constitutional:Pleasant in no acute distress  Psych:A&Ox3  Abdominal: soft and supple non distended, patient has a working urostomy with landa  Lymphatics: no pretibial pitting edema  Spine:          Dressing:  clean/dry/intact                           HV drain in place, patent low back               Sensation:            Upper extremity          grossly intact manually          Lower extremity           grossly intact manually                               Motor:          Lower extremity                     HF(L2)   KE(L3)    TA(L4)   EHL(L5)  GS(S1)                                                 R        5/5        5/5        5/5       5/5         5/5                                               L         5/5        5/5       5/5       5/5          5/5                                                        [x] warm well perfused; capillary refill <3 seconds                A/P : 74yMale   S/P lumbar laminectomy as above POD#0  -    Pain control- multimodal.  PCA over night then step down to oral meds.  Pain management Dr Landrum was consulted due to patient on chronic narcotic medications.   -    DVT ppx: [ x]SCDs[ x] Pharmacolgic   mg once daily x 28 days post op start tomorrow then downgrade to ASA 81 mg once a day as prior to surgery.   -    Periop abx:  Ancef [x ]    -    Check AM labs    -    Monitor Drain Output, can discontinue drain when output equal or less than 10 cc/hr/12 hr.  change dressing when drain pulled and as needed, honeycomb dressing.    -  Resume home meds as appropriate  -PT/OT WBAT, balance and gait  - LSO with OOB not ordered/not mandatory.  Ortho/PT team can reevaluate possible benefit for additional external support daily, in post op period.   -medical follow up- Hospitalist Service  Dr Little  - HTN- DASH diet.  restart antihypertensives when appropriate and with parameters  -HLD- DASH diet, nutrition consult, resume statin  - history of urostomy- urostomy care  - probable home 48-72 hours YOSELIN [id number]    74yMale  Spinal stenosis of lumbar region with neurogenic claudication    Family history of acute lymphoid leukemia (Father)    Handoff    CAD (coronary artery disease)    Congenital extroversion of urinary bladder    Ureterostomy status    Encephalopathy in sepsis    Constipation    CAD (coronary artery disease)    H/O neuropathy    Hyperlipidemia    DVT, lower extremity    HTN (hypertension)    Lumbar stenosis with neurogenic claudication    Lumbosacral stenosis with neurogenic claudication    Spinal stenosis of lumbar region    Spinal stenosis, lumbar region with neurogenic claudication    Need for prophylactic measure    History of urostomy    HTN (hypertension)    CAD (coronary artery disease)    DVT, lower extremity    H/O neuropathy    Laminectomy of lumbar spine at 3 or more levels for stenosis    History of urinary diversion procedure    H/O laminectomy    History of bladder surgery    Stented coronary artery    SPINAL STENOSIS, LUMBAR REGION    SysAdmin_VstLnk    Home Medications:  aspirin 81 mg oral tablet: 1 tab(s) orally once a day (13 Jan 2025 06:44)  atorvastatin 40 mg oral tablet: 1 tab(s) orally once a day (at bedtime) (13 Jan 2025 06:44)  gabapentin 300 mg oral capsule: 1 cap(s) orally 2 times a day (13 Jan 2025 06:44)  HYDROcodone 50 mg oral capsule, extended release: 1 cap(s) orally (13 Jan 2025 06:43)  immunity support gummie once daily:  (13 Jan 2025 06:44)  methocarbamol 500 mg oral tablet: 2 tab(s) orally as needed for  moderate pain 1-0.5mg tablet prn for spasms (13 Jan 2025 06:44)  metoprolol succinate 25 mg oral tablet, extended release: 1 tab(s) orally once a day (at bedtime) (13 Jan 2025 06:44)  multivitamin once daily:  (13 Jan 2025 06:44)  traMADol 50 mg oral tablet: 1 tab(s) orally as needed for  severe pain 1-0.5 tablets once daily PRN (13 Jan 2025 06:44)  vitamin B12 once daily:  (13 Jan 2025 06:44)  vitamin C once daily:  (13 Jan 2025 06:44)  vitamin D once daily:  (13 Jan 2025 06:44)    STATUS POST:  lumbar laminectomyL2 through L5 for lumbar stenosis with neurogenic claudication  SUBJECTIVE: Patient seen and examined doing well    Back Pain controlled, lower extremity pain resolving    OBJECTIVE:   T(C): 36.6 (01-13-25 @ 06:11), Max: 36.6 (01-13-25 @ 06:11)  HR: 66 (01-13-25 @ 06:11) (66 - 66)  BP: 109/64 (01-13-25 @ 06:11) (109/64 - 109/64)  RR: 16 (01-13-25 @ 06:11) (16 - 16)  SpO2: 98% (01-13-25 @ 06:11) (98% - 98%)   Constitutional: Pleasant in no acute distress  Psych:A&Ox3  Abdominal: soft and supple non distended, patient has a working urostomy with landa  Lymphatics: no pretibial pitting edema  Spine:          Dressing:  clean/dry/intact                           HV drain in place, patent low back               Sensation:            Upper extremity          grossly intact manually          Lower extremity           grossly intact manually                               Motor:          Lower extremity                       HF(L2)   KE(L3)    TA(L4)   EHL(L5)  GS(S1)                                                 R        5/5        5/5        5/5       5/5         5/5                                               L         5/5        5/5       5/5       5/5          5/5                                                        [x] warm well perfused; capillary refill <3 seconds                A/P : 74yMale   S/P lumbar laminectomy as above POD#0  -    Pain control- multimodal.  PCA over night then step down to oral meds.  Pain management Dr Landrum was consulted due to patient on chronic narcotic medications.   -    DVT ppx: [ x]SCDs[ x] Pharmacolgic- due to history of DVT and melanoma, lovenox 40 mg sq once daily x 28 days post op start tomorrow then downgrade to ASA 81 mg once a day as prior to surgery.   -    Periop abx:  Ancef [x ]    -    Check AM labs    -    Monitor Drain Output, can discontinue drain when output equal or less than 10 cc/hr/12 hr.  change dressing when drain pulled and as needed, honeycomb dressing.    -  Resume home meds as appropriate  -PT/OT WBAT, balance and gait  - LSO with OOB not ordered/not mandatory.  Ortho/PT team can reevaluate possible benefit for additional external support daily, in post op period.   -medical follow up- Hospitalist Service  Dr Little  - HTN- DASH diet.  restart antihypertensives when appropriate and with parameters  -HLD- DASH diet, nutrition consult, resume statin  - history of urostomy- urostomy care  - probable home 48-72 hours

## 2025-01-13 NOTE — DISCHARGE NOTE PROVIDER - NSDCMRMEDTOKEN_GEN_ALL_CORE_FT
aspirin 81 mg oral tablet: 1 tab(s) orally once a day  atorvastatin 40 mg oral tablet: 1 tab(s) orally once a day (at bedtime)  gabapentin 300 mg oral capsule: 1 cap(s) orally 2 times a day  HYDROcodone 50 mg oral capsule, extended release: 1 cap(s) orally  immunity support gummie once daily:   methocarbamol 500 mg oral tablet: 2 tab(s) orally as needed for  moderate pain 1-0.5mg tablet prn for spasms  metoprolol succinate 25 mg oral tablet, extended release: 1 tab(s) orally once a day (at bedtime)  multivitamin once daily:   traMADol 50 mg oral tablet: 1 tab(s) orally as needed for  severe pain 1-0.5 tablets once daily PRN  vitamin B12 once daily:   vitamin C once daily:   vitamin D once daily:    acetaminophen 325 mg oral tablet: 3 tab(s) orally every 8 hours  atorvastatin 40 mg oral tablet: 1 tab(s) orally once a day (at bedtime)  gabapentin 300 mg oral capsule: 1 cap(s) orally 2 times a day  HYDROcodone 50 mg oral capsule, extended release: 1 cap(s) orally  Lovenox 40 mg/0.4 mL injectable solution: 40 milligram(s) subcutaneously once a day  methocarbamol 500 mg oral tablet: 2 tab(s) orally every 6 hours as needed for  moderate pain 1-0.5mg tablet prn for spasms  metoprolol succinate 25 mg oral tablet, extended release: 1 tab(s) orally once a day (at bedtime)  traMADol 50 mg oral tablet: 1 tab(s) orally every 3 hours as needed for  severe pain 1-0.5 tablets once daily PRN

## 2025-01-13 NOTE — BRIEF OPERATIVE NOTE - NSICDXBRIEFPOSTOP_GEN_ALL_CORE_FT
POST-OP DIAGNOSIS:  Lumbar stenosis with neurogenic claudication 13-Jan-2025 11:02:38  Kalyn Lieberman  
POST-OP DIAGNOSIS:  Lumbar stenosis with neurogenic claudication 13-Jan-2025 11:02:38  Kalyn Lieberman

## 2025-01-14 ENCOUNTER — RESULT REVIEW (OUTPATIENT)
Age: 75
End: 2025-01-14

## 2025-01-14 LAB
ALBUMIN SERPL ELPH-MCNC: 3.5 G/DL — SIGNIFICANT CHANGE UP (ref 3.3–5.2)
ALP SERPL-CCNC: 66 U/L — SIGNIFICANT CHANGE UP (ref 40–120)
ALT FLD-CCNC: 11 U/L — SIGNIFICANT CHANGE UP
ANION GAP SERPL CALC-SCNC: 10 MMOL/L — SIGNIFICANT CHANGE UP (ref 5–17)
ANION GAP SERPL CALC-SCNC: 9 MMOL/L — SIGNIFICANT CHANGE UP (ref 5–17)
APTT BLD: 32.5 SEC — SIGNIFICANT CHANGE UP (ref 24.5–35.6)
AST SERPL-CCNC: 22 U/L — SIGNIFICANT CHANGE UP
BASOPHILS # BLD AUTO: 0.03 K/UL — SIGNIFICANT CHANGE UP (ref 0–0.2)
BASOPHILS NFR BLD AUTO: 0.3 % — SIGNIFICANT CHANGE UP (ref 0–2)
BILIRUB SERPL-MCNC: 0.5 MG/DL — SIGNIFICANT CHANGE UP (ref 0.4–2)
BUN SERPL-MCNC: 19 MG/DL — SIGNIFICANT CHANGE UP (ref 8–20)
BUN SERPL-MCNC: 19.8 MG/DL — SIGNIFICANT CHANGE UP (ref 8–20)
CALCIUM SERPL-MCNC: 8.5 MG/DL — SIGNIFICANT CHANGE UP (ref 8.4–10.5)
CALCIUM SERPL-MCNC: 9.1 MG/DL — SIGNIFICANT CHANGE UP (ref 8.4–10.5)
CHLORIDE SERPL-SCNC: 108 MMOL/L — SIGNIFICANT CHANGE UP (ref 96–108)
CHLORIDE SERPL-SCNC: 108 MMOL/L — SIGNIFICANT CHANGE UP (ref 96–108)
CK MB CFR SERPL CALC: 7 NG/ML — HIGH (ref 0–6.7)
CK SERPL-CCNC: 319 U/L — HIGH (ref 30–200)
CO2 SERPL-SCNC: 23 MMOL/L — SIGNIFICANT CHANGE UP (ref 22–29)
CO2 SERPL-SCNC: 25 MMOL/L — SIGNIFICANT CHANGE UP (ref 22–29)
CREAT SERPL-MCNC: 0.71 MG/DL — SIGNIFICANT CHANGE UP (ref 0.5–1.3)
CREAT SERPL-MCNC: 0.85 MG/DL — SIGNIFICANT CHANGE UP (ref 0.5–1.3)
EGFR: 90 ML/MIN/1.73M2 — SIGNIFICANT CHANGE UP
EGFR: 95 ML/MIN/1.73M2 — SIGNIFICANT CHANGE UP
EOSINOPHIL # BLD AUTO: 0.01 K/UL — SIGNIFICANT CHANGE UP (ref 0–0.5)
EOSINOPHIL NFR BLD AUTO: 0.1 % — SIGNIFICANT CHANGE UP (ref 0–6)
GLUCOSE BLDC GLUCOMTR-MCNC: 119 MG/DL — HIGH (ref 70–99)
GLUCOSE SERPL-MCNC: 111 MG/DL — HIGH (ref 70–99)
GLUCOSE SERPL-MCNC: 119 MG/DL — HIGH (ref 70–99)
HCT VFR BLD CALC: 31.8 % — LOW (ref 39–50)
HCT VFR BLD CALC: 32.6 % — LOW (ref 39–50)
HGB BLD-MCNC: 10.8 G/DL — LOW (ref 13–17)
HGB BLD-MCNC: 11.2 G/DL — LOW (ref 13–17)
IMM GRANULOCYTES NFR BLD AUTO: 0.3 % — SIGNIFICANT CHANGE UP (ref 0–0.9)
INR BLD: 1.11 RATIO — SIGNIFICANT CHANGE UP (ref 0.85–1.16)
LYMPHOCYTES # BLD AUTO: 1.12 K/UL — SIGNIFICANT CHANGE UP (ref 1–3.3)
LYMPHOCYTES # BLD AUTO: 11 % — LOW (ref 13–44)
MAGNESIUM SERPL-MCNC: 1.9 MG/DL — SIGNIFICANT CHANGE UP (ref 1.6–2.6)
MCHC RBC-ENTMCNC: 29.3 PG — SIGNIFICANT CHANGE UP (ref 27–34)
MCHC RBC-ENTMCNC: 29.5 PG — SIGNIFICANT CHANGE UP (ref 27–34)
MCHC RBC-ENTMCNC: 34 G/DL — SIGNIFICANT CHANGE UP (ref 32–36)
MCHC RBC-ENTMCNC: 34.4 G/DL — SIGNIFICANT CHANGE UP (ref 32–36)
MCV RBC AUTO: 85.8 FL — SIGNIFICANT CHANGE UP (ref 80–100)
MCV RBC AUTO: 86.2 FL — SIGNIFICANT CHANGE UP (ref 80–100)
MONOCYTES # BLD AUTO: 0.8 K/UL — SIGNIFICANT CHANGE UP (ref 0–0.9)
MONOCYTES NFR BLD AUTO: 7.9 % — SIGNIFICANT CHANGE UP (ref 2–14)
NEUTROPHILS # BLD AUTO: 8.15 K/UL — HIGH (ref 1.8–7.4)
NEUTROPHILS NFR BLD AUTO: 80.4 % — HIGH (ref 43–77)
PLATELET # BLD AUTO: 198 K/UL — SIGNIFICANT CHANGE UP (ref 150–400)
PLATELET # BLD AUTO: 224 K/UL — SIGNIFICANT CHANGE UP (ref 150–400)
POTASSIUM SERPL-MCNC: 4 MMOL/L — SIGNIFICANT CHANGE UP (ref 3.5–5.3)
POTASSIUM SERPL-MCNC: 4.4 MMOL/L — SIGNIFICANT CHANGE UP (ref 3.5–5.3)
POTASSIUM SERPL-SCNC: 4 MMOL/L — SIGNIFICANT CHANGE UP (ref 3.5–5.3)
POTASSIUM SERPL-SCNC: 4.4 MMOL/L — SIGNIFICANT CHANGE UP (ref 3.5–5.3)
PROT SERPL-MCNC: 5.4 G/DL — LOW (ref 6.6–8.7)
PROTHROM AB SERPL-ACNC: 12.5 SEC — SIGNIFICANT CHANGE UP (ref 9.9–13.4)
RBC # BLD: 3.69 M/UL — LOW (ref 4.2–5.8)
RBC # BLD: 3.8 M/UL — LOW (ref 4.2–5.8)
RBC # FLD: 13.2 % — SIGNIFICANT CHANGE UP (ref 10.3–14.5)
RBC # FLD: 13.5 % — SIGNIFICANT CHANGE UP (ref 10.3–14.5)
SODIUM SERPL-SCNC: 141 MMOL/L — SIGNIFICANT CHANGE UP (ref 135–145)
SODIUM SERPL-SCNC: 142 MMOL/L — SIGNIFICANT CHANGE UP (ref 135–145)
TROPONIN T, HIGH SENSITIVITY RESULT: 13 NG/L — SIGNIFICANT CHANGE UP (ref 0–51)
TSH SERPL-MCNC: 0.91 UIU/ML — SIGNIFICANT CHANGE UP (ref 0.27–4.2)
WBC # BLD: 10.14 K/UL — SIGNIFICANT CHANGE UP (ref 3.8–10.5)
WBC # BLD: 10.68 K/UL — HIGH (ref 3.8–10.5)
WBC # FLD AUTO: 10.14 K/UL — SIGNIFICANT CHANGE UP (ref 3.8–10.5)
WBC # FLD AUTO: 10.68 K/UL — HIGH (ref 3.8–10.5)

## 2025-01-14 PROCEDURE — 99232 SBSQ HOSP IP/OBS MODERATE 35: CPT

## 2025-01-14 PROCEDURE — 99233 SBSQ HOSP IP/OBS HIGH 50: CPT

## 2025-01-14 PROCEDURE — 93010 ELECTROCARDIOGRAM REPORT: CPT

## 2025-01-14 PROCEDURE — 93306 TTE W/DOPPLER COMPLETE: CPT | Mod: 26

## 2025-01-14 RX ORDER — ACETAMINOPHEN 80 MG/.8ML
1000 SOLUTION/ DROPS ORAL ONCE
Refills: 0 | Status: COMPLETED | OUTPATIENT
Start: 2025-01-14 | End: 2025-01-14

## 2025-01-14 RX ORDER — TRAMADOL HYDROCHLORIDE 50 MG/1
50 TABLET ORAL EVERY 4 HOURS
Refills: 0 | Status: DISCONTINUED | OUTPATIENT
Start: 2025-01-14 | End: 2025-01-17

## 2025-01-14 RX ORDER — SODIUM CHLORIDE 9 MG/ML
1000 INJECTION, SOLUTION INTRAMUSCULAR; INTRAVENOUS; SUBCUTANEOUS ONCE
Refills: 0 | Status: DISCONTINUED | OUTPATIENT
Start: 2025-01-14 | End: 2025-01-17

## 2025-01-14 RX ORDER — SODIUM CHLORIDE 9 MG/ML
1000 INJECTION, SOLUTION INTRAVENOUS ONCE
Refills: 0 | Status: DISCONTINUED | OUTPATIENT
Start: 2025-01-14 | End: 2025-01-14

## 2025-01-14 RX ORDER — TRAMADOL HYDROCHLORIDE 50 MG/1
50 TABLET ORAL ONCE
Refills: 0 | Status: DISCONTINUED | OUTPATIENT
Start: 2025-01-14 | End: 2025-01-14

## 2025-01-14 RX ORDER — TRAMADOL HYDROCHLORIDE 50 MG/1
25 TABLET ORAL EVERY 4 HOURS
Refills: 0 | Status: DISCONTINUED | OUTPATIENT
Start: 2025-01-14 | End: 2025-01-17

## 2025-01-14 RX ORDER — TRAMADOL HYDROCHLORIDE 50 MG/1
100 TABLET ORAL EVERY 6 HOURS
Refills: 0 | Status: DISCONTINUED | OUTPATIENT
Start: 2025-01-14 | End: 2025-01-17

## 2025-01-14 RX ADMIN — Medication 500 MILLIGRAM(S): at 21:49

## 2025-01-14 RX ADMIN — ENOXAPARIN SODIUM 40 MILLIGRAM(S): 60 INJECTION INTRAVENOUS; SUBCUTANEOUS at 05:03

## 2025-01-14 RX ADMIN — Medication 200 MILLIGRAM(S): at 06:04

## 2025-01-14 RX ADMIN — Medication 500 MILLIGRAM(S): at 05:03

## 2025-01-14 RX ADMIN — ACETAMINOPHEN 975 MILLIGRAM(S): 80 SOLUTION/ DROPS ORAL at 06:04

## 2025-01-14 RX ADMIN — FENTANYL 30 MILLILITER(S): 75 PATCH, EXTENDED RELEASE TRANSDERMAL at 07:32

## 2025-01-14 RX ADMIN — Medication 200 MILLIGRAM(S): at 19:41

## 2025-01-14 RX ADMIN — TRAMADOL HYDROCHLORIDE 100 MILLIGRAM(S): 50 TABLET ORAL at 18:08

## 2025-01-14 RX ADMIN — ACETAMINOPHEN 975 MILLIGRAM(S): 80 SOLUTION/ DROPS ORAL at 05:04

## 2025-01-14 RX ADMIN — TRAMADOL HYDROCHLORIDE 50 MILLIGRAM(S): 50 TABLET ORAL at 10:43

## 2025-01-14 RX ADMIN — ACETAMINOPHEN 975 MILLIGRAM(S): 80 SOLUTION/ DROPS ORAL at 14:20

## 2025-01-14 RX ADMIN — PANTOPRAZOLE 40 MILLIGRAM(S): 40 TABLET, DELAYED RELEASE ORAL at 05:04

## 2025-01-14 RX ADMIN — GABAPENTIN 300 MILLIGRAM(S): 300 CAPSULE ORAL at 14:20

## 2025-01-14 RX ADMIN — ACETAMINOPHEN 975 MILLIGRAM(S): 80 SOLUTION/ DROPS ORAL at 21:49

## 2025-01-14 RX ADMIN — Medication 500 MILLIGRAM(S): at 14:19

## 2025-01-14 RX ADMIN — GABAPENTIN 300 MILLIGRAM(S): 300 CAPSULE ORAL at 05:04

## 2025-01-14 RX ADMIN — SENNOSIDES 2 TABLET(S): 8.6 TABLET, FILM COATED ORAL at 21:48

## 2025-01-14 RX ADMIN — ACETAMINOPHEN 975 MILLIGRAM(S): 80 SOLUTION/ DROPS ORAL at 22:49

## 2025-01-14 RX ADMIN — TRAMADOL HYDROCHLORIDE 100 MILLIGRAM(S): 50 TABLET ORAL at 19:08

## 2025-01-14 RX ADMIN — GABAPENTIN 300 MILLIGRAM(S): 300 CAPSULE ORAL at 21:49

## 2025-01-14 RX ADMIN — ACETAMINOPHEN 400 MILLIGRAM(S): 80 SOLUTION/ DROPS ORAL at 10:42

## 2025-01-14 RX ADMIN — ATORVASTATIN CALCIUM 40 MILLIGRAM(S): 40 TABLET, FILM COATED ORAL at 21:49

## 2025-01-14 RX ADMIN — Medication 200 MILLIGRAM(S): at 05:04

## 2025-01-14 NOTE — RAPID RESPONSE TEAM SUMMARY - NSSITUATIONBACKGROUNDRRT_GEN_ALL_CORE
74M s/p L2-L5 laminecotmy on POD 1.    RRT called for hypotension and AMS. Per RN pt was being ambulated, at which point he c/o lightheadedness. Pt was then brought back to the bed and reclined. BP was 99/68. Pt was give 1L NS bolus w/ improvement in symptoms. He is currently on a PCA pump which may be contributing to his low BP.

## 2025-01-14 NOTE — PROGRESS NOTE ADULT - SUBJECTIVE AND OBJECTIVE BOX
Interval Hx:  Patient seen during rounds  RRT this am  now mentating well  Patient reports pain to be controlled on current medications  Patient denies sedation with medications     Analgesic Dosing for past 24 hours reviewed as below:    acetaminophen     Tablet ..   975 milliGRAM(s) Oral (01-14-25 @ 05:04)   975 milliGRAM(s) Oral (01-13-25 @ 22:07)   975 milliGRAM(s) Oral (01-13-25 @ 15:54)    acetaminophen   IVPB ..   400 mL/Hr IV Intermittent (01-14-25 @ 10:42)    celecoxib   200 milliGRAM(s) Oral (01-14-25 @ 05:04)    gabapentin   300 milliGRAM(s) Oral (01-14-25 @ 05:04)   300 milliGRAM(s) Oral (01-13-25 @ 22:08)   300 milliGRAM(s) Oral (01-13-25 @ 15:53)    ketorolac   Injectable   30 milliGRAM(s) IV Push (01-13-25 @ 15:54)    methocarbamol   500 milliGRAM(s) Oral (01-14-25 @ 05:03)   500 milliGRAM(s) Oral (01-13-25 @ 22:07)   500 milliGRAM(s) Oral (01-13-25 @ 15:54)    traMADol   50 milliGRAM(s) Oral (01-14-25 @ 10:43)          T(C): 36.4 (01-14-25 @ 11:14), Max: 36.7 (01-13-25 @ 13:03)  HR: 75 (01-14-25 @ 11:14) (64 - 82)  BP: 114/67 (01-14-25 @ 11:14) (99/68 - 123/79)  RR: 18 (01-14-25 @ 11:14) (11 - 18)  SpO2: 92% (01-14-25 @ 11:14) (91% - 97%)      01-13-25 @ 07:01  -  01-14-25 @ 07:00  --------------------------------------------------------  IN: 940 mL / OUT: 1595 mL / NET: -655 mL    01-14-25 @ 07:01  -  01-14-25 @ 12:15  --------------------------------------------------------  IN: 240 mL / OUT: 350 mL / NET: -110 mL        acetaminophen     Tablet .. 975 milliGRAM(s) Oral every 8 hours  aluminum hydroxide/magnesium hydroxide/simethicone Suspension 30 milliLiter(s) Oral every 12 hours PRN  atorvastatin 40 milliGRAM(s) Oral at bedtime  celecoxib 200 milliGRAM(s) Oral every 12 hours  enoxaparin Injectable 40 milliGRAM(s) SubCutaneous every 24 hours  gabapentin 300 milliGRAM(s) Oral every 8 hours  lactated ringers. 1000 milliLiter(s) IV Continuous <Continuous>  magnesium hydroxide Suspension 30 milliLiter(s) Oral every 12 hours PRN  methocarbamol 500 milliGRAM(s) Oral every 8 hours  metoprolol succinate ER 25 milliGRAM(s) Oral daily  naloxone Injectable 0.1 milliGRAM(s) IV Push every 3 minutes PRN  ondansetron Injectable 4 milliGRAM(s) IV Push every 6 hours PRN  ondansetron Injectable 4 milliGRAM(s) IV Push every 6 hours PRN  ondansetron Injectable 4 milliGRAM(s) IV Push every 6 hours PRN  pantoprazole    Tablet 40 milliGRAM(s) Oral before breakfast  senna 2 Tablet(s) Oral at bedtime  sodium chloride 0.9% Bolus 1000 milliLiter(s) IV Bolus once  traMADol 25 milliGRAM(s) Oral every 4 hours PRN  traMADol 50 milliGRAM(s) Oral every 4 hours PRN  traMADol 100 milliGRAM(s) Oral every 6 hours PRN                          11.2   10.68 )-----------( 224      ( 14 Jan 2025 10:41 )             32.6     01-14    142  |  108  |  19.8  ----------------------------<  119[H]  4.0   |  25.0  |  0.85    Ca    9.1      14 Jan 2025 10:41  Mg     1.9     01-14    TPro  5.4[L]  /  Alb  3.5  /  TBili  0.5  /  DBili  x   /  AST  22  /  ALT  11  /  AlkPhos  66  01-14    PT/INR - ( 14 Jan 2025 10:41 )   PT: 12.5 sec;   INR: 1.11 ratio         PTT - ( 14 Jan 2025 10:41 )  PTT:32.5 sec  Urinalysis Basic - ( 14 Jan 2025 10:41 )    Color: x / Appearance: x / SG: x / pH: x  Gluc: 119 mg/dL / Ketone: x  / Bili: x / Urobili: x   Blood: x / Protein: x / Nitrite: x   Leuk Esterase: x / RBC: x / WBC x   Sq Epi: x / Non Sq Epi: x / Bacteria: x        Pain Service   [phone]

## 2025-01-14 NOTE — PROGRESS NOTE ADULT - SUBJECTIVE AND OBJECTIVE BOX
Ortho PA note:     RR called by RN due to patient experiencing dizziness and near syncope after working w PT.  Patient states he feels dizzy and lightheaded.     Vital Signs Last 24 Hrs  T(C): 36.5 (14 Jan 2025 04:19), Max: 36.7 (13 Jan 2025 13:03)  T(F): 97.7 (14 Jan 2025 04:19), Max: 98 (13 Jan 2025 13:03)  HR: 70 (14 Jan 2025 04:19) (64 - 82)  BP: 99/68 (14 Jan 2025 04:19) (99/68 - 123/79)  BP(mean): 78 (13 Jan 2025 12:15) (78 - 86)  RR: 18 (14 Jan 2025 04:19) (10 - 18)  SpO2: 95% (14 Jan 2025 04:19) (91% - 97%)    Parameters below as of 14 Jan 2025 04:19  Patient On (Oxygen Delivery Method): room air    Patient now in bed complaining of pain. Yet BP and HR remain WNL 71HR,       Discussion with medical team, Dr Guzman made aware  WIll DC PCA  Will resume patient's home mediation of tramadol.  Will monitor CBC and reevaluate for transfusion if needed   Appreciate RR team and nursing staff       Ortho PA note:     RR called by RN due to patient experiencing dizziness and had a syncopal episode  in bed after working w PT.  Patient states he feels dizzy and lightheaded.     Vital Signs Last 24 Hrs  T(C): 36.5 (14 Jan 2025 04:19), Max: 36.7 (13 Jan 2025 13:03)  T(F): 97.7 (14 Jan 2025 04:19), Max: 98 (13 Jan 2025 13:03)  HR: 70 (14 Jan 2025 04:19) (64 - 82)  BP: 99/68 (14 Jan 2025 04:19) (99/68 - 123/79)  BP(mean): 78 (13 Jan 2025 12:15) (78 - 86)  RR: 18 (14 Jan 2025 04:19) (10 - 18)  SpO2: 95% (14 Jan 2025 04:19) (91% - 97%)    Parameters below as of 14 Jan 2025 04:19  Patient On (Oxygen Delivery Method): room air    Patient now in bed complaining of pain. Yet BP and HR remain WNL 71HR,       Discussion with medical team, Dr Guzman made aware  WiIll DC PCA  Will resume patient's home mediation of tramadol.  Will monitor CBC and reevaluate for transfusion if needed   Appreciate RR team and nursing staff  Rhythm strip obtained shows 7 sec pause. Cardiac team consulted

## 2025-01-14 NOTE — PROGRESS NOTE ADULT - ASSESSMENT
74-year-old male presents to PST with PMH of CAD 2 stents, extrophy of bladder, s/p 30 urological surgeries, encephalopathy, s/p urostomy placed 2015, melanoma s/p removal x2 on neck and right cheek, who presents to [facility] for a scheduled  L2-L5 laminectomy. Pain management was consulted for pain control optimization.  RRT this am, now mentating well  pain controlled at this time    Plan:  - cont tramadol 25/50/100 prn, max daily dose = 400mg  - If no contraindication to NSAIDs, cont celebrex 200mg PO q12h x 5days, with food. HOLD for black/red stools.  - change  robaxin 500mg TID prn spasms  - lidocaine   Patch 12 hours on, 12 hours off. Max 3 patches on at one time    if leg pain remains controlled, can DC gabapentin

## 2025-01-14 NOTE — PROGRESS NOTE ADULT - SUBJECTIVE AND OBJECTIVE BOX
CC: Leg pain with walking/standing (13 Jan 2025 11:57)    HPI:  75 y/o male with Hx of CAD 2 stents, extrophy of bladder, s/p 30 urological surgeries, encephalopathy, s/p urostomy placed 2015, melanoma s/p removal x2 on neck and right cheek, presents with lower back pain severely worsening since Thanksgiving. He has had chronic lower back pain for several years. He was seeing pain management Dr. Marley for this who has provided him with multiple medications including narcotics, oxycodone, tramadol, hydrocodone, etc. He began to experience excruciating pain in the right lower extremity L5/S1 distribution. Patient has had an epidural injection and this had improved symptoms. Patient has a Hx of posterior cervical laminectomy fusion for what sounds to be a cervical myelopathy. He has had residual numbness tingling in the bilateral lower extremities which was only moderately improved and was more intermittent in nature. He did have some difficulties with balance which has been chronic in nature long-term since previous cervical fusion. He found improvement from previous injection by Dr. Marley for approximately 24 hours and then pain subsequently returned. Patient had lower back pain but primarily right lower extremity radiculopathy. Patient was unable to weight-bear/stand for prolonged period of time secondary to worsening pain and was unable to walk for prolonged periods. He came in here for elective L2-L5 laminectomy on 1/13/2025 with Dr. Guzman s/p procedure.     INTERVAL HPI/OVERNIGHT EVENTS:  Patient seen and examined sitting up in bed.  Patient reports pain controlled with PCA.  Patient complaining of lightheadedness.  Patient denies any headache, SOB, CP, abdominal pain, nausea, vomiting.  Other ROS reviewed and are negative.  Patient noted with desaturations intermittently overnight.      Vital Signs Last 24 Hrs  T(C): 36.5 (14 Jan 2025 04:19), Max: 36.7 (13 Jan 2025 13:03)  T(F): 97.7 (14 Jan 2025 04:19), Max: 98 (13 Jan 2025 13:03)  HR: 70 (14 Jan 2025 04:19) (64 - 82)  BP: 99/68 (14 Jan 2025 04:19) (99/68 - 123/79)  BP(mean): 78 (13 Jan 2025 12:15) (78 - 86)  RR: 18 (14 Jan 2025 04:19) (10 - 18)  SpO2: 95% (14 Jan 2025 04:19) (91% - 97%)    Parameters below as of 14 Jan 2025 04:19  Patient On (Oxygen Delivery Method): room air      I&O's Detail    13 Jan 2025 07:01  -  14 Jan 2025 07:00  --------------------------------------------------------  IN:    Lactated Ringers: 880 mL    Oral Fluid: 60 mL  Total IN: 940 mL    OUT:    Bulb (mL): 95 mL    Voided (mL): 1500 mL  Total OUT: 1595 mL    Total NET: -655 mL      PHYSICAL EXAM:  GENERAL: NAD  HEAD:  Atraumatic, Normocephalic  NECK: Supple, No JVD, Normal thyroid  NERVOUS SYSTEM:  Alert & Oriented X3, Good concentration; Motor Strength 5/5 B/L upper and lower extremities  CHEST/LUNG: Clear to auscultation bilaterally  HEART: Regular rate and rhythm; No murmurs, rubs, or gallops  ABDOMEN: Soft, Nontender, Nondistended; Bowel sounds present; (+) Urostomy  EXTREMITIES:  2+ Peripheral Pulses, No edema  SKIN: Lower back with clean dressing and JPx1                            10.8   10.14 )-----------( 198      ( 14 Jan 2025 05:10 )             31.8     14 Jan 2025 05:10    141    |  108    |  19.0   ----------------------------<  111    4.4     |  23.0   |  0.71     Ca    8.5        14 Jan 2025 05:10        Urinalysis Basic - ( 14 Jan 2025 05:10 )    Color: x / Appearance: x / SG: x / pH: x  Gluc: 111 mg/dL / Ketone: x  / Bili: x / Urobili: x   Blood: x / Protein: x / Nitrite: x   Leuk Esterase: x / RBC: x / WBC x   Sq Epi: x / Non Sq Epi: x / Bacteria: x        MEDICATIONS  (STANDING):  acetaminophen     Tablet .. 975 milliGRAM(s) Oral every 8 hours  atorvastatin 40 milliGRAM(s) Oral at bedtime  celecoxib 200 milliGRAM(s) Oral every 12 hours  enoxaparin Injectable 40 milliGRAM(s) SubCutaneous every 24 hours  fentaNYL PCA (50 MICROgram(s)/mL) 30 milliLiter(s) PCA Continuous PCA Continuous  gabapentin 300 milliGRAM(s) Oral every 8 hours  lactated ringers. 1000 milliLiter(s) (80 mL/Hr) IV Continuous <Continuous>  methocarbamol 500 milliGRAM(s) Oral every 8 hours  metoprolol succinate ER 25 milliGRAM(s) Oral daily  pantoprazole    Tablet 40 milliGRAM(s) Oral before breakfast  senna 2 Tablet(s) Oral at bedtime    MEDICATIONS  (PRN):  aluminum hydroxide/magnesium hydroxide/simethicone Suspension 30 milliLiter(s) Oral every 12 hours PRN Indigestion  magnesium hydroxide Suspension 30 milliLiter(s) Oral every 12 hours PRN Constipation  naloxone Injectable 0.1 milliGRAM(s) IV Push every 3 minutes PRN For ANY of the following changes in patient status:  A. RR LESS THAN 10 breaths per minute, B. Oxygen saturation LESS THAN 90%, C. Sedation score of 6  ondansetron Injectable 4 milliGRAM(s) IV Push every 6 hours PRN Nausea  ondansetron Injectable 4 milliGRAM(s) IV Push every 6 hours PRN Nausea and/or Vomiting  ondansetron Injectable 4 milliGRAM(s) IV Push every 6 hours PRN Nausea and/or Vomiting

## 2025-01-14 NOTE — CHART NOTE - NSCHARTNOTEFT_GEN_A_CORE
patient was noted to have 2 pauses on cardiac monitor during rapid response this am   One pause of 7 seconds and second one was 4 seconds  EKG done showed no acute changes   Labs done showed electrolytes ok, trops negative   as per patient he never been diagnosed with any heart blocks  will add on TSH, Lyme titer  TTE ordered   will transfer to stepdown   EP and cardiology consulted.

## 2025-01-14 NOTE — RAPID RESPONSE TEAM SUMMARY - NSADDTLFINDINGSRRT_GEN_ALL_CORE
Vital Signs  T(F): 97.7  HR: 70  BP: 99/68  RR: 18  SpO2: 95%      PHYSICAL EXAM:  GENERAL: Patient appears well, lying in bed; NAD, appropriate, pleasant  LUNGS: Good air entry bilaterally, clear to auscultation b/l, symmetric breath sounds; No wheezing or rhonchi appreciated, unlabored respirations  HEART: Regular rate and rhythm  NEUROLOGIC: Awake, alert & oriented X 3, answers questions appropriately, no gross neuro deficits

## 2025-01-14 NOTE — PROGRESS NOTE ADULT - ASSESSMENT
75 y/o male with Hx of CAD 2 stents, extrophy of bladder, s/p 30 urological surgeries, encephalopathy, s/p urostomy placed 2015, melanoma s/p removal x2 on neck and right cheek, presents with lower back pain severely worsening since Thanksgiving. He has had chronic lower back pain for several years. He was seeing pain management Dr. Marley for this who has provided him with multiple medications including narcotics, oxycodone, tramadol, hydrocodone, etc. He began to experience excruciating pain in the right lower extremity L5/S1 distribution. Patient has had an epidural injection and this had improved symptoms. He has Hx of posterior cervical laminectomy fusion for what sounds to be a cervical myelopathy. He has had residual numbness tingling in the bilateral lower extremities which has only moderately improved and was more intermittent in nature. He did have some difficulties with balance which has been chronic in nature long-term since previous cervical fusion. He found improvement from previous injection by Dr. Marley for approximately 24 hours and then pain subsequently returned. He has lower back pain but primarily right lower extremity radiculopathy, he was unable to weight-bear/stand for prolonged period of time secondary to worsening pain and unable to walk for prolonged periods. Patient came in here for elective L2-L5 laminectomy on 1/13/2025 with Dr. Guzman s/p procedure.     Plan:     Lower back pain s/p  L2-L5 laminectomy post op day 1:     - post op no complications  - VS stable  - abx per ortho   - c/w anti hypertensive to be restarted post op day 02 except if blood pressure goes >150 systolic   - c/w IVF x 24 hrs then reassess per ortho  - opiate induced constipation regimen   - encouraging incentive spirometry   -c/w local wound care per ortho   -DVT prophylaxis and Pain meds as per Ortho team and pain management   -PT/OT and weight bearing per ortho   -Continue methocarbamol and gabapentin      HTN: will continue with metoprolol succinate 25 mg once a day (at bedtime)    Hx of CAD: will continue with aspirin 81 mg once a day, when okay with Ortho.     Hx of HLD: will continue with atorvastatin 40 mg once a day (at bedtime).     DVT prophylaxis: Lovenox as per Ortho. 73 y/o male with Hx of CAD 2 stents, extrophy of bladder, s/p 30 urological surgeries, encephalopathy, s/p urostomy placed 2015, melanoma s/p removal x2 on neck and right cheek, presents with lower back pain severely worsening since Thanksgiving. He has had chronic lower back pain for several years. He was seeing pain management Dr. Marley for this who has provided him with multiple medications including narcotics, oxycodone, tramadol, hydrocodone, etc. He began to experience excruciating pain in the right lower extremity L5/S1 distribution. Patient has had an epidural injection and this had improved symptoms. He has Hx of posterior cervical laminectomy fusion for what sounds to be a cervical myelopathy. He has had residual numbness tingling in the bilateral lower extremities which has only moderately improved and was more intermittent in nature. He did have some difficulties with balance which has been chronic in nature long-term since previous cervical fusion. He found improvement from previous injection by Dr. Marley for approximately 24 hours and then pain subsequently returned. He has lower back pain but primarily right lower extremity radiculopathy, he was unable to weight-bear/stand for prolonged period of time secondary to worsening pain and unable to walk for prolonged periods. Patient came in here for elective L2-L5 laminectomy on 1/13/2025 with Dr. Guzman s/p procedure.     Plan:     Lower back pain s/p  L2-L5 laminectomy post op day 1:     - post op no complications  - VS stable  - abx per ortho   - c/w anti hypertensive to be restarted post op day 02 except if blood pressure goes >150 systolic   - c/w IVF x 24 hrs then reassess per ortho  - opiate induced constipation regimen   - encouraging incentive spirometry   -c/w local wound care per ortho   -DVT prophylaxis and Pain meds as per Ortho team and pain management   -PT/OT and weight bearing per ortho   -Continue methocarbamol and gabapentin      HTN: will continue with metoprolol succinate 25 mg once a day (at bedtime)    Hx of CAD: will continue with aspirin 81 mg once a day, when okay with Ortho.     Hx of HLD: will continue with atorvastatin 40 mg once a day (at bedtime).     Intermittent nocturnal hypoxia   Recommend outpatient sleep study to r/o VALENTINE.  Discussed with patient.    DVT prophylaxis: Lovenox as per Ortho.

## 2025-01-14 NOTE — CONSULT NOTE ADULT - NS ATTEND AMEND GEN_ALL_CORE FT
Agree with assessment and plan.  telemetry demonstrates marked PP and AL prolongation prior to pause, with acceleration thereafter. A prodrome and a provoking trigger (nausea, pain) were present, making VVS very likely.  I would try to avoid triggers as much as possible and agree with FU plan.

## 2025-01-14 NOTE — CONSULT NOTE ADULT - SUBJECTIVE AND OBJECTIVE BOX
[place] [facility]                                   [address] [place] [address]                                                      PHONE: [phone]                                                         FAX: [phone]  [email or web address]  ---------------------------------------------------------------------------------------------------------------------------------    Reason for Consult:ana  [employer]: [employer]  HPI:  YOSELIN DORADO is an 74y Male PMHx Anterior wall MI 2994 sp PCI to LAD (Cypher stents), HLD    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 975 milliGRAM(s) Oral every 8 hours  atorvastatin 40 milliGRAM(s) Oral at bedtime  celecoxib 200 milliGRAM(s) Oral every 12 hours  enoxaparin Injectable 40 milliGRAM(s) SubCutaneous every 24 hours  gabapentin 300 milliGRAM(s) Oral every 8 hours  lactated ringers. 1000 milliLiter(s) (80 mL/Hr) IV Continuous <Continuous>  methocarbamol 500 milliGRAM(s) Oral every 8 hours  metoprolol succinate ER 25 milliGRAM(s) Oral daily  pantoprazole    Tablet 40 milliGRAM(s) Oral before breakfast  senna 2 Tablet(s) Oral at bedtime  sodium chloride 0.9% Bolus 1000 milliLiter(s) IV Bolus once    MEDICATIONS  (PRN):  aluminum hydroxide/magnesium hydroxide/simethicone Suspension 30 milliLiter(s) Oral every 12 hours PRN Indigestion  magnesium hydroxide Suspension 30 milliLiter(s) Oral every 12 hours PRN Constipation  naloxone Injectable 0.1 milliGRAM(s) IV Push every 3 minutes PRN For ANY of the following changes in patient status:  A. RR LESS THAN 10 breaths per minute, B. Oxygen saturation LESS THAN 90%, C. Sedation score of 6  ondansetron Injectable 4 milliGRAM(s) IV Push every 6 hours PRN Nausea  ondansetron Injectable 4 milliGRAM(s) IV Push every 6 hours PRN Nausea and/or Vomiting  ondansetron Injectable 4 milliGRAM(s) IV Push every 6 hours PRN Nausea and/or Vomiting  traMADol 25 milliGRAM(s) Oral every 4 hours PRN Mild Pain (1 - 3)  traMADol 50 milliGRAM(s) Oral every 4 hours PRN Moderate Pain (4 - 6)  traMADol 100 milliGRAM(s) Oral every 6 hours PRN Severe Pain (7 - 10)      Social History:  Cigarettes:     no               Alchohol:     no            Illicit Drug Abuse:  no  FHx no SCD  ROS: Negative other than as mentioned in HPI.    Vital Signs Last 24 Hrs  T(C): 36.3 (14 Jan 2025 12:24), Max: 36.5 (13 Jan 2025 16:49)  T(F): 97.4 (14 Jan 2025 12:24), Max: 97.7 (13 Jan 2025 16:49)  HR: 68 (14 Jan 2025 12:24) (68 - 82)  BP: 92/59 (14 Jan 2025 12:24) (92/59 - 123/79)  BP(mean): --  RR: 17 (14 Jan 2025 12:24) (17 - 18)  SpO2: 99% (14 Jan 2025 12:24) (92% - 99%)    Parameters below as of 14 Jan 2025 12:24  Patient On (Oxygen Delivery Method): room air      ICU Vital Signs Last 24 Hrs  YOSELIN DORADO  I&O's Detail    13 Jan 2025 07:01  -  14 Jan 2025 07:00  --------------------------------------------------------  IN:    Lactated Ringers: 880 mL    Oral Fluid: 60 mL  Total IN: 940 mL    OUT:    Bulb (mL): 95 mL    Voided (mL): 1500 mL  Total OUT: 1595 mL    Total NET: -655 mL      14 Jan 2025 07:01  -  14 Jan 2025 13:32  --------------------------------------------------------  IN:    Oral Fluid: 240 mL  Total IN: 240 mL    OUT:    Bulb (mL): 0 mL    Urostomy (mL): 350 mL  Total OUT: 350 mL    Total NET: -110 mL        I&O's Summary    13 Jan 2025 07:01  -  14 Jan 2025 07:00  --------------------------------------------------------  IN: 940 mL / OUT: 1595 mL / NET: -655 mL    14 Jan 2025 07:01  -  14 Jan 2025 13:32  --------------------------------------------------------  IN: 240 mL / OUT: 350 mL / NET: -110 mL      Drug Dosing Weight  YOSELIN DORADO      PHYSICAL EXAM:  General:  alert and cooperative.  HEENT: Head; normocephalic, atraumatic.  Eyes: Pupils reactive, cornea wnl.  Neck: Supple, no nodes adenopathy, no NVD or carotid bruit or thyromegaly.  CARDIOVASCULAR: Normal S1 and S2, No murmur, rub, gallop or lift.   LUNGS: No rales, rhonchi or wheeze. Normal breath sounds bilaterally.  ABDOMEN: Soft, nontender without mass or organomegaly. bowel sounds normoactive.  EXTREMITIES: No clubbing, cyanosis or edema. Distal pulses wnl.   SKIN: warm and dry with normal turgor.  NEURO: Alert/oriented x 3/normal motor exam. No pathologic reflexes.    PSYCH: normal affect.        LABS:                        11.2   10.68 )-----------( 224      ( 14 Jan 2025 10:41 )             32.6     01-14    142  |  108  |  19.8  ----------------------------<  119[H]  4.0   |  25.0  |  0.85    Ca    9.1      14 Jan 2025 10:41  Mg     1.9     01-14    TPro  5.4[L]  /  Alb  3.5  /  TBili  0.5  /  DBili  x   /  AST  22  /  ALT  11  /  AlkPhos  66  01-14    YOSELIN DORADO  CARDIAC MARKERS ( 14 Jan 2025 11:05 )  x     / x     / x     / x     / 7.0 ng/mL      PT/INR - ( 14 Jan 2025 10:41 )   PT: 12.5 sec;   INR: 1.11 ratio         PTT - ( 14 Jan 2025 10:41 )  PTT:32.5 sec  Urinalysis Basic - ( 14 Jan 2025 10:41 )    Color: x / Appearance: x / SG: x / pH: x  Gluc: 119 mg/dL / Ketone: x  / Bili: x / Urobili: x   Blood: x / Protein: x / Nitrite: x   Leuk Esterase: x / RBC: x / WBC x   Sq Epi: x / Non Sq Epi: x / Bacteria: x        RADIOLOGY & ADDITIONAL STUDIES:    INTERPRETATION OF TELEMETRY (personally reviewed):    ECG: NS @ 64 no acute ischemic changes         Assessment and Plan:  In summary, YOSELIN DORADO is an 74y Male with past medical history significant for                      [place] [facility]                                   [address] [place] [address]                                                      PHONE: [phone]                                                         FAX: [phone]  [email or web address]  ---------------------------------------------------------------------------------------------------------------------------------    Reason for Consult:ana  [employer]: [employer]  HPI:  YOSELIN DORADO is an 74y Male 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 Postoperative course complicated by lightheadedness after working with PT.  Prior to this episode when in significant discomfort he had a 7 second pause.  PT feels well currently.    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 975 milliGRAM(s) Oral every 8 hours  atorvastatin 40 milliGRAM(s) Oral at bedtime  celecoxib 200 milliGRAM(s) Oral every 12 hours  enoxaparin Injectable 40 milliGRAM(s) SubCutaneous every 24 hours  gabapentin 300 milliGRAM(s) Oral every 8 hours  lactated ringers. 1000 milliLiter(s) (80 mL/Hr) IV Continuous <Continuous>  methocarbamol 500 milliGRAM(s) Oral every 8 hours  metoprolol succinate ER 25 milliGRAM(s) Oral daily  pantoprazole    Tablet 40 milliGRAM(s) Oral before breakfast  senna 2 Tablet(s) Oral at bedtime  sodium chloride 0.9% Bolus 1000 milliLiter(s) IV Bolus once    MEDICATIONS  (PRN):  aluminum hydroxide/magnesium hydroxide/simethicone Suspension 30 milliLiter(s) Oral every 12 hours PRN Indigestion  magnesium hydroxide Suspension 30 milliLiter(s) Oral every 12 hours PRN Constipation  naloxone Injectable 0.1 milliGRAM(s) IV Push every 3 minutes PRN For ANY of the following changes in patient status:  A. RR LESS THAN 10 breaths per minute, B. Oxygen saturation LESS THAN 90%, C. Sedation score of 6  ondansetron Injectable 4 milliGRAM(s) IV Push every 6 hours PRN Nausea  ondansetron Injectable 4 milliGRAM(s) IV Push every 6 hours PRN Nausea and/or Vomiting  ondansetron Injectable 4 milliGRAM(s) IV Push every 6 hours PRN Nausea and/or Vomiting  traMADol 25 milliGRAM(s) Oral every 4 hours PRN Mild Pain (1 - 3)  traMADol 50 milliGRAM(s) Oral every 4 hours PRN Moderate Pain (4 - 6)  traMADol 100 milliGRAM(s) Oral every 6 hours PRN Severe Pain (7 - 10)      Social History:  Cigarettes:     no               Alchohol:     no            Illicit Drug Abuse:  no  FHx no SCD  ROS: Negative other than as mentioned in HPI.    Vital Signs Last 24 Hrs  T(C): 36.3 (14 Jan 2025 12:24), Max: 36.5 (13 Jan 2025 16:49)  T(F): 97.4 (14 Jan 2025 12:24), Max: 97.7 (13 Jan 2025 16:49)  HR: 68 (14 Jan 2025 12:24) (68 - 82)  BP: 92/59 (14 Jan 2025 12:24) (92/59 - 123/79)  BP(mean): --  RR: 17 (14 Jan 2025 12:24) (17 - 18)  SpO2: 99% (14 Jan 2025 12:24) (92% - 99%)    Parameters below as of 14 Jan 2025 12:24  Patient On (Oxygen Delivery Method): room air      ICU Vital Signs Last 24 Hrs  YOSELIN DORADO  I&O's Detail    13 Jan 2025 07:01  -  14 Jan 2025 07:00  --------------------------------------------------------  IN:    Lactated Ringers: 880 mL    Oral Fluid: 60 mL  Total IN: 940 mL    OUT:    Bulb (mL): 95 mL    Voided (mL): 1500 mL  Total OUT: 1595 mL    Total NET: -655 mL      14 Jan 2025 07:01  -  14 Jan 2025 13:32  --------------------------------------------------------  IN:    Oral Fluid: 240 mL  Total IN: 240 mL    OUT:    Bulb (mL): 0 mL    Urostomy (mL): 350 mL  Total OUT: 350 mL    Total NET: -110 mL        I&O's Summary    13 Jan 2025 07:01  -  14 Jan 2025 07:00  --------------------------------------------------------  IN: 940 mL / OUT: 1595 mL / NET: -655 mL    14 Jan 2025 07:01  -  14 Jan 2025 13:32  --------------------------------------------------------  IN: 240 mL / OUT: 350 mL / NET: -110 mL      Drug Dosing Weight  YOSELIN DORADO      PHYSICAL EXAM:  General:  alert and cooperative.  HEENT: Head; normocephalic, atraumatic.  Eyes: Pupils reactive, cornea wnl.  Neck: Supple, no nodes adenopathy, no NVD or carotid bruit or thyromegaly.  CARDIOVASCULAR: Normal S1 and S2, No murmur, rub, gallop or lift.   LUNGS: No rales, rhonchi or wheeze. Normal breath sounds bilaterally.  ABDOMEN: Soft, nontender without mass or organomegaly. bowel sounds normoactive.  EXTREMITIES: No clubbing, cyanosis or edema. Distal pulses wnl.   SKIN: warm and dry with normal turgor.  NEURO: Alert/oriented x 3/normal motor exam. No pathologic reflexes.    PSYCH: normal affect.        LABS:                        11.2   10.68 )-----------( 224      ( 14 Jan 2025 10:41 )             32.6     01-14    142  |  108  |  19.8  ----------------------------<  119[H]  4.0   |  25.0  |  0.85    Ca    9.1      14 Jan 2025 10:41  Mg     1.9     01-14    TPro  5.4[L]  /  Alb  3.5  /  TBili  0.5  /  DBili  x   /  AST  22  /  ALT  11  /  AlkPhos  66  01-14    YOSELIN DORADO  CARDIAC MARKERS ( 14 Jan 2025 11:05 )  x     / x     / x     / x     / 7.0 ng/mL      PT/INR - ( 14 Jan 2025 10:41 )   PT: 12.5 sec;   INR: 1.11 ratio         PTT - ( 14 Jan 2025 10:41 )  PTT:32.5 sec  Urinalysis Basic - ( 14 Jan 2025 10:41 )    Color: x / Appearance: x / SG: x / pH: x  Gluc: 119 mg/dL / Ketone: x  / Bili: x / Urobili: x   Blood: x / Protein: x / Nitrite: x   Leuk Esterase: x / RBC: x / WBC x   Sq Epi: x / Non Sq Epi: x / Bacteria: x        RADIOLOGY & ADDITIONAL STUDIES:    INTERPRETATION OF TELEMETRY (personally reviewed): as noted    ECG: NS @ 64 no acute ischemic changes         Assessment and Plan:  In summary, YOSELIN DORADO is an 74y Male with 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 Postoperative course complicated by lightheadedness after working with PT.  Prior to this episode when in significant discomfort he had a 7 second pause.  PT feels well currently.    Likely pause due to increased vagal tone    1) DC toprol  2) Monitor on tele. EKG with narrow QRS without evidence of block  3) EP will see  4) Will follow

## 2025-01-14 NOTE — CHART NOTE - NSCHARTNOTEFT_GEN_A_CORE
RAPID RESPONSE FOLLOW UP NOTE.    Patient seen and examined at bedside for follow up at 1240. Patient complaining of lower back pain.   Telemetry reviewed and patient was noted to have 7sec pause and 4sec pause during RRT.  Currently NSR 60s.    PHYSICAL EXAM ----------  Vital Signs Last 24 Hrs  T(C): 36.3 (14 Jan 2025 12:24), Max: 36.5 (13 Jan 2025 16:49)  T(F): 97.4 (14 Jan 2025 12:24), Max: 97.7 (13 Jan 2025 16:49)  HR: 68 (14 Jan 2025 12:24) (68 - 82)  BP: 92/59 (14 Jan 2025 12:24) (92/59 - 123/79)  BP(mean): --  RR: 17 (14 Jan 2025 12:24) (17 - 18)  SpO2: 99% (14 Jan 2025 12:24) (92% - 99%)    Parameters below as of 14 Jan 2025 12:24  Patient On (Oxygen Delivery Method): room air        PHYSICAL EXAM:  GENERAL: NAD  HEAD:  Atraumatic, Normocephalic  NECK: Supple, No JVD, Normal thyroid  NERVOUS SYSTEM:  Alert & Oriented X3, Good concentration; Motor Strength 5/5 B/L upper and lower extremities  CHEST/LUNG: Clear to auscultation bilaterally  HEART: Regular rate and rhythm; No murmurs, rubs, or gallops  ABDOMEN: Soft, Nontender, Nondistended; Bowel sounds present  EXTREMITIES:  2+ Peripheral Pulses  SKIN: Lower back with clean dressing      I&O's Summary    13 Jan 2025 07:01  -  14 Jan 2025 07:00  --------------------------------------------------------  IN: 940 mL / OUT: 1595 mL / NET: -655 mL    14 Jan 2025 07:01  -  14 Jan 2025 14:40  --------------------------------------------------------  IN: 240 mL / OUT: 350 mL / NET: -110 mL        LABS                         11.2   10.68 )-----------( 224      ( 14 Jan 2025 10:41 )             32.6       01-14    142  |  108  |  19.8  ----------------------------<  119[H]  4.0   |  25.0  |  0.85    Ca    9.1      14 Jan 2025 10:41  Mg     1.9     01-14    TPro  5.4[L]  /  Alb  3.5  /  TBili  0.5  /  DBili  x   /  AST  22  /  ALT  11  /  AlkPhos  66  01-14      LIVER FUNCTIONS - ( 14 Jan 2025 10:41 )  Alb: 3.5 g/dL / Pro: 5.4 g/dL / ALK PHOS: 66 U/L / ALT: 11 U/L / AST: 22 U/L / GGT: x             CAPILLARY BLOOD GLUCOSE  POCT Blood Glucose.: 119 mg/dL (14 Jan 2025 10:34)      PT/INR - ( 14 Jan 2025 10:41 )   PT: 12.5 sec;   INR: 1.11 ratio         PTT - ( 14 Jan 2025 10:41 )  PTT:32.5 sec    Urinalysis Basic - ( 14 Jan 2025 10:41 )    Color: x / Appearance: x / SG: x / pH: x  Gluc: 119 mg/dL / Ketone: x  / Bili: x / Urobili: x   Blood: x / Protein: x / Nitrite: x   Leuk Esterase: x / RBC: x / WBC x   Sq Epi: x / Non Sq Epi: x / Bacteria: x        CARDIAC MARKERS ( 14 Jan 2025 11:05 )  x     / x     / x     / x     / 7.0 ng/mL          ASSESSMENT/ PLAN   75 y/o male with Hx of CAD 2 stents, extrophy of bladder, s/p 30 urological surgeries, encephalopathy, s/p urostomy placed 2015, melanoma s/p removal x2 on neck and right cheek, presents with lower back pain.  Patient s/p elective L2-L5 laminectomy on 1/13/2025 with Dr. Guzman.  RRT called for hypotension and AMS. Per RN pt was sitting up in the chair and c/o lightheadedness and then syncopized. Pt was brought back to the bed and reclined. BP was 99/68. Pt was give 1L NS bolus w/ improvement in symptoms.  Telemetry noted 7second pause and then 4second pause during RRT.      Vasovagal syncope with 7second and 4second pauses noted on telemetry.  S/p IV bolus.  Monitor BP.  Fentanyl PCA discontinued during RRT.  Tramadol prn for pain as per pain management.  Cardiology and EP consulted.  EKG with NSR.  TSH 0.91.  Trop negative.  Lyme titers ordered.  TTE ordered.  VS [id number].    DISPOSITION:  - Transfer to Stepdown RAPID RESPONSE FOLLOW UP NOTE.    Patient seen and examined at bedside for follow up at 1240. Patient complaining of lower back pain.   Telemetry reviewed and patient was noted to have 7sec pause and 4sec pause during RRT.  Currently NSR 60s.    PHYSICAL EXAM ----------  Vital Signs Last 24 Hrs  T(C): 36.3 (14 Jan 2025 12:24), Max: 36.5 (13 Jan 2025 16:49)  T(F): 97.4 (14 Jan 2025 12:24), Max: 97.7 (13 Jan 2025 16:49)  HR: 68 (14 Jan 2025 12:24) (68 - 82)  BP: 92/59 (14 Jan 2025 12:24) (92/59 - 123/79)  BP(mean): --  RR: 17 (14 Jan 2025 12:24) (17 - 18)  SpO2: 99% (14 Jan 2025 12:24) (92% - 99%)    Parameters below as of 14 Jan 2025 12:24  Patient On (Oxygen Delivery Method): room air        PHYSICAL EXAM:  GENERAL: NAD  HEAD:  Atraumatic, Normocephalic  NECK: Supple, No JVD, Normal thyroid  NERVOUS SYSTEM:  Alert & Oriented X3, Good concentration; Motor Strength 5/5 B/L upper and lower extremities  CHEST/LUNG: Clear to auscultation bilaterally  HEART: Regular rate and rhythm; No murmurs, rubs, or gallops  ABDOMEN: Soft, Nontender, Nondistended; Bowel sounds present  EXTREMITIES:  2+ Peripheral Pulses  SKIN: Lower back with clean dressing      I&O's Summary    13 Jan 2025 07:01  -  14 Jan 2025 07:00  --------------------------------------------------------  IN: 940 mL / OUT: 1595 mL / NET: -655 mL    14 Jan 2025 07:01  -  14 Jan 2025 14:40  --------------------------------------------------------  IN: 240 mL / OUT: 350 mL / NET: -110 mL        LABS                         11.2   10.68 )-----------( 224      ( 14 Jan 2025 10:41 )             32.6       01-14    142  |  108  |  19.8  ----------------------------<  119[H]  4.0   |  25.0  |  0.85    Ca    9.1      14 Jan 2025 10:41  Mg     1.9     01-14    TPro  5.4[L]  /  Alb  3.5  /  TBili  0.5  /  DBili  x   /  AST  22  /  ALT  11  /  AlkPhos  66  01-14      LIVER FUNCTIONS - ( 14 Jan 2025 10:41 )  Alb: 3.5 g/dL / Pro: 5.4 g/dL / ALK PHOS: 66 U/L / ALT: 11 U/L / AST: 22 U/L / GGT: x             CAPILLARY BLOOD GLUCOSE  POCT Blood Glucose.: 119 mg/dL (14 Jan 2025 10:34)      PT/INR - ( 14 Jan 2025 10:41 )   PT: 12.5 sec;   INR: 1.11 ratio         PTT - ( 14 Jan 2025 10:41 )  PTT:32.5 sec      ASSESSMENT/ PLAN   75 y/o male with Hx of CAD 2 stents, extrophy of bladder, s/p 30 urological surgeries, encephalopathy, s/p urostomy placed 2015, melanoma s/p removal x2 on neck and right cheek, presents with lower back pain.  Patient s/p elective L2-L5 laminectomy on 1/13/2025 with Dr. Guzman.  RRT called for hypotension and AMS. Per RN pt was sitting up in the chair and c/o lightheadedness and then syncopized. Pt was brought back to the bed and reclined. BP was 99/68. Pt was give 1L NS bolus w/ improvement in symptoms.  Telemetry noted 7second pause and then 4second pause during RRT.      Vasovagal syncope with 7second and 4second pauses noted on telemetry.  S/p IV bolus.  Monitor BP.  Fentanyl PCA discontinued during RRT.  Tramadol prn for pain as per pain management.  Cardiology and EP consulted.  EKG with NSR.  TSH 0.91.  Trop negative.  Lyme titers ordered.  TTE ordered.  VS [id number].    DISPOSITION:  - Transfer to Stepdown      Attending addendum:   On follow up patient is doing ok, has no more nausea, his pain is well controlled.   EP consult and cardiology consult appreciated  will continue to monitor in stepdown.

## 2025-01-14 NOTE — PROGRESS NOTE ADULT - SUBJECTIVE AND OBJECTIVE BOX
ORTHO-SPINE PROGRESS NOTE:    Pt Name: YOSELIN DORADO    MRN: [id number]      74M s/p L2-L5 laminecotmy on POD 1. Patient visited and examined at bedside. Patient resting comfortably and without any complaints. Pain is well manage with current pain medications. Denies numbness, paresthesias, CP, SOB,     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  Encephalopathy in sepsis  due to urinary disorder needed surgery diversion of ureter 2014  CAD (coronary artery disease)  H/O neuropathy  Hyperlipidemia  DVT, lower extremity  HTN (hypertension)  History of urinary diversion procedure  with urostomy  April 2014 with bag  H/O laminectomy  History of bladder surgery  Stented coronary artery    Allergies: Sulfur Precipitated (Rash)    Medications: acetaminophen     Tablet .. 975 milliGRAM(s) Oral every 8 hours  aluminum hydroxide/magnesium hydroxide/simethicone Suspension 30 milliLiter(s) Oral every 12 hours PRN  atorvastatin 40 milliGRAM(s) Oral at bedtime  celecoxib 200 milliGRAM(s) Oral every 12 hours  enoxaparin Injectable 40 milliGRAM(s) SubCutaneous every 24 hours  fentaNYL PCA (50 MICROgram(s)/mL) 30 milliLiter(s) PCA Continuous PCA Continuous  gabapentin 300 milliGRAM(s) Oral every 8 hours  lactated ringers. 1000 milliLiter(s) IV Continuous <Continuous>  magnesium hydroxide Suspension 30 milliLiter(s) Oral every 12 hours PRN  methocarbamol 500 milliGRAM(s) Oral every 8 hours  metoprolol succinate ER 25 milliGRAM(s) Oral daily  naloxone Injectable 0.1 milliGRAM(s) IV Push every 3 minutes PRN  ondansetron Injectable 4 milliGRAM(s) IV Push every 6 hours PRN  ondansetron Injectable 4 milliGRAM(s) IV Push every 6 hours PRN  ondansetron Injectable 4 milliGRAM(s) IV Push every 6 hours PRN  pantoprazole    Tablet 40 milliGRAM(s) Oral before breakfast  senna 2 Tablet(s) Oral at bedtime                            10.8   10.14 )-----------( 198      ( 14 Jan 2025 05:10 )             31.8     01-14    141  |  108  |  19.0  ----------------------------<  111[H]  4.4   |  23.0  |  0.71    Ca    8.5      14 Jan 2025 05:10        PHYSICAL EXAM:    Vital Signs Last 24 Hrs  T(C): 36.5 (14 Jan 2025 04:19), Max: 36.7 (13 Jan 2025 13:03)  T(F): 97.7 (14 Jan 2025 04:19), Max: 98 (13 Jan 2025 13:03)  HR: 70 (14 Jan 2025 04:19) (64 - 82)  BP: 99/68 (14 Jan 2025 04:19) (99/68 - 123/79)  BP(mean): 78 (13 Jan 2025 12:15) (78 - 86)  RR: 18 (14 Jan 2025 04:19) (10 - 18)  SpO2: 95% (14 Jan 2025 04:19) (91% - 97%)    Parameters below as of 14 Jan 2025 04:19  Patient On (Oxygen Delivery Method): room air    Appearance: Alert, responsive, in no acute distress.  Skin: no rash on visible skin. Skin is clean, dry and intact. No bleeding. No abrasions. No ulcerations.             Sensation:          Upper extremity                ax        mc           m          u          r                                                         R          +           +             +           +          +                                               L           +           +             +           +          +         Lower extremity             sp         dp         saph       asher         tibial                                                R          +           +             +           +          +                                               L           +           +             +           +          +                     Motor exam:          Upper extremity              Bi(c5)  WE(c6)  EE(c7)   FF(c8)                                                R         5/5        5/5        5/5       5/5                                               L          5/5        5/5        5/5       5/5         Lower extremity                        HF(l2)   KE(l3)    TA(l4)   EHL(l5)  GS(s1)                                                 R          5/5        5/5       5/5       5/5         5/5                                               L         5/5        5/5       5/5       5/5          5/5      A:  74M s/p L2-L5 laminecotmy on POD 1    PLAN:   * Pain control  * DVT ppx: [x]SCDs  [x] Pharmacolgic    * Monitor Drain Output, will discontinue drain when output equal or less than 10 cc/hr/12 hr.  Change dressing when drain pulled and as needed, honeycomb dressing.  * Hx of urostomy - urostomy care  * Weight bearing status: WBAT  * Continue with physical therapy  * Home vs [facility]   ORTHO-SPINE PROGRESS NOTE:    Pt Name: YOSELIN DORADO    MRN: [id number]      74M s/p L2-L5 laminecotmy on POD 1. Patient visited and examined at bedside. Drain 25/95. Patient resting comfortably and without any complaints. Pain is well manage with current pain medications. Denies numbness, paresthesias, CP, SOB,     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  Encephalopathy in sepsis  due to urinary disorder needed surgery diversion of ureter 2014  CAD (coronary artery disease)  H/O neuropathy  Hyperlipidemia  DVT, lower extremity  HTN (hypertension)  History of urinary diversion procedure  with urostomy  April 2014 with bag  H/O laminectomy  History of bladder surgery  Stented coronary artery    Allergies: Sulfur Precipitated (Rash)    Medications: acetaminophen     Tablet .. 975 milliGRAM(s) Oral every 8 hours  aluminum hydroxide/magnesium hydroxide/simethicone Suspension 30 milliLiter(s) Oral every 12 hours PRN  atorvastatin 40 milliGRAM(s) Oral at bedtime  celecoxib 200 milliGRAM(s) Oral every 12 hours  enoxaparin Injectable 40 milliGRAM(s) SubCutaneous every 24 hours  fentaNYL PCA (50 MICROgram(s)/mL) 30 milliLiter(s) PCA Continuous PCA Continuous  gabapentin 300 milliGRAM(s) Oral every 8 hours  lactated ringers. 1000 milliLiter(s) IV Continuous <Continuous>  magnesium hydroxide Suspension 30 milliLiter(s) Oral every 12 hours PRN  methocarbamol 500 milliGRAM(s) Oral every 8 hours  metoprolol succinate ER 25 milliGRAM(s) Oral daily  naloxone Injectable 0.1 milliGRAM(s) IV Push every 3 minutes PRN  ondansetron Injectable 4 milliGRAM(s) IV Push every 6 hours PRN  ondansetron Injectable 4 milliGRAM(s) IV Push every 6 hours PRN  ondansetron Injectable 4 milliGRAM(s) IV Push every 6 hours PRN  pantoprazole    Tablet 40 milliGRAM(s) Oral before breakfast  senna 2 Tablet(s) Oral at bedtime                            10.8   10.14 )-----------( 198      ( 14 Jan 2025 05:10 )             31.8     01-14    141  |  108  |  19.0  ----------------------------<  111[H]  4.4   |  23.0  |  0.71    Ca    8.5      14 Jan 2025 05:10        PHYSICAL EXAM:    Vital Signs Last 24 Hrs  T(C): 36.5 (14 Jan 2025 04:19), Max: 36.7 (13 Jan 2025 13:03)  T(F): 97.7 (14 Jan 2025 04:19), Max: 98 (13 Jan 2025 13:03)  HR: 70 (14 Jan 2025 04:19) (64 - 82)  BP: 99/68 (14 Jan 2025 04:19) (99/68 - 123/79)  BP(mean): 78 (13 Jan 2025 12:15) (78 - 86)  RR: 18 (14 Jan 2025 04:19) (10 - 18)  SpO2: 95% (14 Jan 2025 04:19) (91% - 97%)    Parameters below as of 14 Jan 2025 04:19  Patient On (Oxygen Delivery Method): room air    Appearance: Alert, responsive, in no acute distress.  Skin: no rash on visible skin. Skin is clean, dry and intact. No bleeding. No abrasions. No ulcerations.           Sensation:          Upper extremity                ax        mc           m          u          r                                                         R          +           +             +           +          +                                               L           +           +             +           +          +         Lower extremity             sp         dp         saph       asher         tibial                                                R          +           +             +           +          +                                               L           +           +             +           +          +                     Motor exam:          Upper extremity              Bi(c5)  WE(c6)  EE(c7)   FF(c8)                                                R         5/5        5/5        5/5       5/5                                               L          5/5        5/5        5/5       5/5         Lower extremity                        HF(l2)   KE(l3)    TA(l4)   EHL(l5)  GS(s1)                                                 R          5/5        5/5       5/5       5/5         5/5                                               L         5/5        5/5       5/5       5/5          5/5      A:  74M s/p L2-L5 laminecotmy on POD 1    PLAN:   * Pain control  * DVT ppx: [x]SCDs  [x] Pharmacolgic    * Monitor Drain Output, will discontinue drain when output equal or less than 10 cc/hr/12 hr.  Change dressing when drain pulled and as needed, honeycomb dressing.  * Hx of urostomy - urostomy care  * Weight bearing status: WBAT  * Continue with physical therapy  * Home vs [facility]

## 2025-01-14 NOTE — CONSULT NOTE ADULT - SUBJECTIVE AND OBJECTIVE BOX
Outpatient Cardiologist: Dr. Cass Harrell    74 year old male with CAD s/p AW MI s/p TNK + PCI/stents to LAD (2004), GERD, HTN, HLD, extrophy of bladder, s/p 30 urological surgeries, s/p urostomy placed , melanoma s/p removal x2 who initially presented with lower back pain severely worsening sine thanksgiving. He began to experience excruciating pain in the right lower extremity. He is admitted and now POD#1 s/p lumbar laminectomy. Previously he had a history of syncopal episodes in  after eating at a restaurant. Saw his Cardiologist (see above) who stopped his Ramipril and changed his diet and exercise regiment.     Cardiac Summary:   TTE 2018: EF 55-60%; no valvular heart disease  Nuclear Stress Test: 2018: Negative stress test; EF 74%.     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  Encephalopathy in sepsis  due to urinary disorder needed surgery diversion of ureter   CAD (coronary artery disease)  H/O neuropathy  Hyperlipidemia  DVT, lower extremity  HTN (hypertension)  History of urinary diversion procedure  with urostomy  2014 with bag  H/O laminectomy  History of bladder surgery  Stented coronary artery    REVIEW OF SYSTEMS  General:	  Skin/Breast:	  Ophthalmologic:	  ENMT:	  Respiratory and Thorax:  Cardiovascular:	  Gastrointestinal:	  Genitourinary:	  Musculoskeletal:	  Neurological:	  Psychiatric:	  Hematology/Lymphatics:	  Endocrine:	  Allergic/Immunologic:	    MEDICATIONS  (STANDING):  acetaminophen     Tablet .. 975 milliGRAM(s) Oral every 8 hours  atorvastatin 40 milliGRAM(s) Oral at bedtime  celecoxib 200 milliGRAM(s) Oral every 12 hours  enoxaparin Injectable 40 milliGRAM(s) SubCutaneous every 24 hours  gabapentin 300 milliGRAM(s) Oral every 8 hours  lactated ringers. 1000 milliLiter(s) (80 mL/Hr) IV Continuous <Continuous>  methocarbamol 500 milliGRAM(s) Oral every 8 hours  metoprolol succinate ER 25 milliGRAM(s) Oral daily  pantoprazole    Tablet 40 milliGRAM(s) Oral before breakfast  senna 2 Tablet(s) Oral at bedtime  sodium chloride 0.9% Bolus 1000 milliLiter(s) IV Bolus once    MEDICATIONS  (PRN):  aluminum hydroxide/magnesium hydroxide/simethicone Suspension 30 milliLiter(s) Oral every 12 hours PRN Indigestion  magnesium hydroxide Suspension 30 milliLiter(s) Oral every 12 hours PRN Constipation  naloxone Injectable 0.1 milliGRAM(s) IV Push every 3 minutes PRN For ANY of the following changes in patient status:  A. RR LESS THAN 10 breaths per minute, B. Oxygen saturation LESS THAN 90%, C. Sedation score of 6  ondansetron Injectable 4 milliGRAM(s) IV Push every 6 hours PRN Nausea  ondansetron Injectable 4 milliGRAM(s) IV Push every 6 hours PRN Nausea and/or Vomiting  ondansetron Injectable 4 milliGRAM(s) IV Push every 6 hours PRN Nausea and/or Vomiting  traMADol 25 milliGRAM(s) Oral every 4 hours PRN Mild Pain (1 - 3)  traMADol 50 milliGRAM(s) Oral every 4 hours PRN Moderate Pain (4 - 6)  traMADol 100 milliGRAM(s) Oral every 6 hours PRN Severe Pain (7 - 10)    Allergies  Sulfur Precipitated (Rash)    SOCIAL HISTORY: Former smoker, stopped in     FAMILY HISTORY:  Family history of acute lymphoid leukemia (Father)    Vital Signs Last 24 Hrs  T(C): 36.3 (2025 12:24), Max: 36.5 (2025 16:49)  T(F): 97.4 (2025 12:24), Max: 97.7 (2025 16:49)  HR: 68 (2025 12:24) (68 - 82)  BP: 92/59 (2025 12:24) (92/59 - 123/79)  RR: 17 (2025 12:24) (17 - 18)  SpO2: 99% (2025 12:24) (92% - 99%)    Physical Exam:  Constitutional: AAOx3, NAD  Neck: supple, No JVD  Cardiovascular: +S1S2 RRR, no murmurs, rubs, gallops   Pulmonary: CTA b/l, unlabored, no wheezes, rales. rhonci  Abdomen: +BS, soft NTND  Extremities: no edema b/l, +distal pulses b/l  Neuro: non focal, speech clear, PARDO x 4    LABS:                        11.2   10.68 )-----------( 224      ( 2025 10:41 )             32.6     142  |  108  |  19.8  ----------------------------<  119[H]  4.0   |  25.0  |  0.85  Ca    9.1      2025 10:41  Mg     1.9       TPro  5.4[L]  /  Alb  3.5  /  TBili  0.5  /  DBili  x   /  AST  22  /  ALT  11  /  AlkPhos  66  -  LIVER FUNCTIONS - ( 2025 10:41 )  Alb: 3.5 g/dL / Pro: 5.4 g/dL / ALK PHOS: 66 U/L / ALT: 11 U/L / AST: 22 U/L / GGT: x         PT/INR - ( 2025 10:41 )   PT: 12.5 sec;   INR: 1.11 ratio    PTT - ( 2025 10:41 )  PTT:32.5 secCARDIAC MARKERS ( 2025 11:05 )  x     / x     / x     / x     / 7.0 ng/mL    RADIOLOGY & ADDITIONAL STUDIES:  EK2025: SR at 64bpm; QRSD 102ms; NH 174ms Outpatient Cardiologist: Dr. Cass Harrell    74 year old male with CAD s/p AW MI s/p TNK + PCI/stents to LAD (2004), GERD, HTN, HLD, extrophy of bladder, s/p 30 urological surgeries, s/p urostomy placed , melanoma s/p removal x2 who initially presented with lower back pain severely worsening sine thanksgiving. He began to experience excruciating pain in the right lower extremity. He is admitted and now POD#1 s/p lumbar laminectomy. Previously he had a history of syncopal episodes in  after eating at a restaurant. Saw his Cardiologist (see above) who stopped his Ramipril and changed his diet and exercise regiment.     Cardiac Summary:   TTE 2018: EF 55-60%; no valvular heart disease  Nuclear Stress Test: 2018: Negative stress test; EF 74%.     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  Encephalopathy in sepsis  due to urinary disorder needed surgery diversion of ureter   CAD (coronary artery disease)  H/O neuropathy  Hyperlipidemia  DVT, lower extremity  HTN (hypertension)  History of urinary diversion procedure  with urostomy  2014 with bag  H/O laminectomy  History of bladder surgery  Stented coronary artery    REVIEW OF SYSTEMS  General:	  Skin/Breast:	  Ophthalmologic:	  ENMT:	  Respiratory and Thorax:  Cardiovascular:	  Gastrointestinal:	  Genitourinary:	  Musculoskeletal:	  Neurological:	  Psychiatric:	  Hematology/Lymphatics:	  Endocrine:	  Allergic/Immunologic:	    MEDICATIONS  (STANDING):  acetaminophen     Tablet .. 975 milliGRAM(s) Oral every 8 hours  atorvastatin 40 milliGRAM(s) Oral at bedtime  celecoxib 200 milliGRAM(s) Oral every 12 hours  enoxaparin Injectable 40 milliGRAM(s) SubCutaneous every 24 hours  gabapentin 300 milliGRAM(s) Oral every 8 hours  lactated ringers. 1000 milliLiter(s) (80 mL/Hr) IV Continuous <Continuous>  methocarbamol 500 milliGRAM(s) Oral every 8 hours  metoprolol succinate ER 25 milliGRAM(s) Oral daily  pantoprazole    Tablet 40 milliGRAM(s) Oral before breakfast  senna 2 Tablet(s) Oral at bedtime  sodium chloride 0.9% Bolus 1000 milliLiter(s) IV Bolus once    MEDICATIONS  (PRN):  aluminum hydroxide/magnesium hydroxide/simethicone Suspension 30 milliLiter(s) Oral every 12 hours PRN Indigestion  magnesium hydroxide Suspension 30 milliLiter(s) Oral every 12 hours PRN Constipation  naloxone Injectable 0.1 milliGRAM(s) IV Push every 3 minutes PRN For ANY of the following changes in patient status:  A. RR LESS THAN 10 breaths per minute, B. Oxygen saturation LESS THAN 90%, C. Sedation score of 6  ondansetron Injectable 4 milliGRAM(s) IV Push every 6 hours PRN Nausea  ondansetron Injectable 4 milliGRAM(s) IV Push every 6 hours PRN Nausea and/or Vomiting  ondansetron Injectable 4 milliGRAM(s) IV Push every 6 hours PRN Nausea and/or Vomiting  traMADol 25 milliGRAM(s) Oral every 4 hours PRN Mild Pain (1 - 3)  traMADol 50 milliGRAM(s) Oral every 4 hours PRN Moderate Pain (4 - 6)  traMADol 100 milliGRAM(s) Oral every 6 hours PRN Severe Pain (7 - 10)    Allergies  Sulfur Precipitated (Rash)    SOCIAL HISTORY: Former smoker, stopped in     FAMILY HISTORY:  Family history of acute lymphoid leukemia (Father)    Vital Signs Last 24 Hrs  T(C): 36.3 (2025 12:24), Max: 36.5 (2025 16:49)  T(F): 97.4 (2025 12:24), Max: 97.7 (2025 16:49)  HR: 68 (2025 12:24) (68 - 82)  BP: 92/59 (2025 12:24) (92/59 - 123/79)  RR: 17 (2025 12:24) (17 - 18)  SpO2: 99% (2025 12:24) (92% - 99%)    Physical Exam:  Constitutional: AAOx3, NAD  Neck: supple, No JVD  Cardiovascular: +S1S2 RRR, no murmurs, rubs, gallops   Pulmonary: CTA b/l, unlabored, no wheezes, rales. rhonci  Abdomen: +BS, soft NTND  Extremities: no edema b/l, +distal pulses b/l  Neuro: non focal, speech clear, PARDO x 4    LABS:                        11.2   10.68 )-----------( 224      ( 2025 10:41 )             32.6     142  |  108  |  19.8  ----------------------------<  119[H]  4.0   |  25.0  |  0.85  Ca    9.1      2025 10:41  Mg     1.9       TPro  5.4[L]  /  Alb  3.5  /  TBili  0.5  /  DBili  x   /  AST  22  /  ALT  11  /  AlkPhos  66  -  LIVER FUNCTIONS - ( 2025 10:41 )  Alb: 3.5 g/dL / Pro: 5.4 g/dL / ALK PHOS: 66 U/L / ALT: 11 U/L / AST: 22 U/L / GGT: x         PT/INR - ( 2025 10:41 )   PT: 12.5 sec;   INR: 1.11 ratio    PTT - ( 2025 10:41 )  PTT:32.5 secCARDIAC MARKERS ( 2025 11:05 )  x     / x     / x     / x     / 7.0 ng/mL    RADIOLOGY & ADDITIONAL STUDIES:  EK2025: SR at 64bpm; QRSD 102ms; WV 174ms    Telemetry: SR with pause of 7.92 seconds recorded on 25 @ 10:25AM    A/P  74 year old male with CAD s/p AW MI s/p TNK + PCI/stents to LAD (2004), GERD, HTN, HLD, extrophy of bladder, s/p 30 urological surgeries, s/p urostomy placed , melanoma s/p removal x2 who is POD#1 s/p L2-L5 laminectomy. EP consulted regarding transient pause of 7.92 seconds today.     Baseline EKG with normal intervals  Syncope with clear prodrome per history.          Outpatient Cardiologist: Dr. Cass Harrell and now currently Kishor Velasquez MD    74 year old male with CAD s/p AW MI s/p TNK + PCI/stents to LAD (2004), GERD, HTN, HLD, extrophy of bladder, s/p 30 urological surgeries, s/p urostomy placed , melanoma s/p removal x2 who initially presented with lower back pain severely worsening sine thanksgiving. He began to experience excruciating pain in the right lower extremity. He is admitted and now POD#1 s/p lumbar laminectomy.     Previously he had a history of syncopal episode in  after eating at a restaurant. Reports he was biking all day and didn't drink any water. Had on sip of an alcoholic beverage and "passed out." Saw his Cardiologist (see above) who stopped his Ramipril and changed his diet and exercise regiment.     Today he reports he was in bed and getting up for the first time. He noticed his morning blood pressure was lower than usual He was in moderate discomfort but was able to tolerate going to the recliner and sitting down. He reports she sat down and felt a wave of nausea pass over him with a flushed sensation and perspiration. He then became very dizzy but denies any syncope. He was moved to the bed but had trouble finding works to express himself. He then realized there were many people in his room.     Cardiac Summary:   TTE 2018: EF 55-60%; no valvular heart disease  Nuclear Stress Test: 2018: Negative stress test; EF 74%.     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 to age 14 years old  Encephalopathy in sepsis  due to urinary disorder needed surgery diversion of ureter   CAD (coronary artery disease)  H/O neuropathy  Hyperlipidemia  DVT, lower extremity  HTN (hypertension)  History of urinary diversion procedure  with urostomy  2014 with bag  H/O laminectomy  History of bladder surgery  Stented coronary artery    REVIEW OF SYSTEMS  General: - fever or chills, - fatigue  Skin/Breast: - rashes  Ophthalmologic: - blurred vision	  ENMT: - sore throat  Respiratory and Thorax: - cough  Cardiovascular: - chest pain or palpitations, + dizziness  Gastrointestinal:	+nausea, - vomiting   Genitourinary: - dysuria  Musculoskeletal:	 + back pain  Neurological: - weaknesses  Psychiatric: - anxiety     MEDICATIONS  (STANDING):  acetaminophen     Tablet .. 975 milliGRAM(s) Oral every 8 hours  atorvastatin 40 milliGRAM(s) Oral at bedtime  celecoxib 200 milliGRAM(s) Oral every 12 hours  enoxaparin Injectable 40 milliGRAM(s) SubCutaneous every 24 hours  gabapentin 300 milliGRAM(s) Oral every 8 hours  lactated ringers. 1000 milliLiter(s) (80 mL/Hr) IV Continuous <Continuous>  methocarbamol 500 milliGRAM(s) Oral every 8 hours  metoprolol succinate ER 25 milliGRAM(s) Oral daily  pantoprazole    Tablet 40 milliGRAM(s) Oral before breakfast  senna 2 Tablet(s) Oral at bedtime  sodium chloride 0.9% Bolus 1000 milliLiter(s) IV Bolus once    MEDICATIONS  (PRN):  aluminum hydroxide/magnesium hydroxide/simethicone Suspension 30 milliLiter(s) Oral every 12 hours PRN Indigestion  magnesium hydroxide Suspension 30 milliLiter(s) Oral every 12 hours PRN Constipation  naloxone Injectable 0.1 milliGRAM(s) IV Push every 3 minutes PRN For ANY of the following changes in patient status:  A. RR LESS THAN 10 breaths per minute, B. Oxygen saturation LESS THAN 90%, C. Sedation score of 6  ondansetron Injectable 4 milliGRAM(s) IV Push every 6 hours PRN Nausea  ondansetron Injectable 4 milliGRAM(s) IV Push every 6 hours PRN Nausea and/or Vomiting  ondansetron Injectable 4 milliGRAM(s) IV Push every 6 hours PRN Nausea and/or Vomiting  traMADol 25 milliGRAM(s) Oral every 4 hours PRN Mild Pain (1 - 3)  traMADol 50 milliGRAM(s) Oral every 4 hours PRN Moderate Pain (4 - 6)  traMADol 100 milliGRAM(s) Oral every 6 hours PRN Severe Pain (7 - 10)    Allergies  Sulfur Precipitated (Rash)    SOCIAL HISTORY: Former smoker, stopped in     FAMILY HISTORY:  Family history of acute lymphoid leukemia (Father)    Vital Signs Last 24 Hrs  T(C): 36.3 (2025 12:24), Max: 36.5 (2025 16:49)  T(F): 97.4 (2025 12:24), Max: 97.7 (2025 16:49)  HR: 68 (2025 12:24) (68 - 82)  BP: 92/59 (2025 12:24) (92/59 - 123/79)  RR: 17 (2025 12:24) (17 - 18)  SpO2: 99% (2025 12:24) (92% - 99%)    Physical Exam:  Constitutional: AAOx3, NAD, moderate back pain currently 7/10  Neck: supple, No JVD  Cardiovascular: +S1S2 RRR, no murmurs, rubs, gallops   Pulmonary: CTA b/l, unlabored, no wheezes, rales. No rhonchi  Abdomen: +BS, soft NTND  Extremities: no edema b/l,   Neuro: non focal, speech clear, PARDO x 4  Psych: appropriate mod and affect.     LABS:                        11.2   1068 )-----------( 224      ( 2025 10:41 )             32.6     142  |  108  |  19.8  ----------------------------<  119[H]  4.0   |  25.0  |  0.85  Ca    9.1      2025 10:41  Mg     1.9     -14  TPro  5.4[L]  /  Alb  3.5  /  TBili  0.5  /  DBili  x   /  AST  22  /  ALT  11  /  AlkPhos  66  01-14  LIVER FUNCTIONS - ( 2025 10:41 )  Alb: 3.5 g/dL / Pro: 5.4 g/dL / ALK PHOS: 66 U/L / ALT: 11 U/L / AST: 22 U/L / GGT: x         PT/INR - ( 2025 10:41 )   PT: 12.5 sec;   INR: 1.11 ratio    PTT - ( 2025 10:41 )  PTT:32.5 secCARDIAC MARKERS ( 2025 11:05 )  x     / x     / x     / x     / 7.0 ng/mL    RADIOLOGY & ADDITIONAL STUDIES:  EK2025: SR at 64bpm; QRSD 102ms; CA 174ms    Telemetry: SR with pause of 7.92 seconds recorded on 1/14/25 @ 10:25AM    A/P  74 year old male with CAD s/p AW MI s/p TNK + PCI/stents to LAD (2004), GERD, HTN, HLD, extrophy of bladder, s/p 30 urological surgeries, s/p urostomy placed , melanoma s/p removal x2 who is POD#1 s/p L2-L5 laminectomy. EP consulted regarding transient pause of 7.92 seconds today.     Baseline EKG with normal intervals and narrow QRS  Syncope with clear prodrome of nausea and pain per history      - Cardiology consult appreciated - agree with stopping beta blocker for now.   - Agree with TTE - previously normal in 2018  - Agree with lyme titers however no recent camping or seasonal tick activity  - Agree with Zofran for nausea  - No current indication for urgent pacemaker at this time. Would recommend follow up with Dr. Velasquez upon discharge to potentially discuss MCOT or long term monitoring with ILR.

## 2025-01-15 LAB
ANION GAP SERPL CALC-SCNC: 9 MMOL/L — SIGNIFICANT CHANGE UP (ref 5–17)
BASOPHILS # BLD AUTO: 0.06 K/UL — SIGNIFICANT CHANGE UP (ref 0–0.2)
BASOPHILS NFR BLD AUTO: 0.8 % — SIGNIFICANT CHANGE UP (ref 0–2)
BUN SERPL-MCNC: 16.8 MG/DL — SIGNIFICANT CHANGE UP (ref 8–20)
CALCIUM SERPL-MCNC: 8.7 MG/DL — SIGNIFICANT CHANGE UP (ref 8.4–10.5)
CHLORIDE SERPL-SCNC: 109 MMOL/L — HIGH (ref 96–108)
CO2 SERPL-SCNC: 24 MMOL/L — SIGNIFICANT CHANGE UP (ref 22–29)
CREAT SERPL-MCNC: 0.67 MG/DL — SIGNIFICANT CHANGE UP (ref 0.5–1.3)
EGFR: 97 ML/MIN/1.73M2 — SIGNIFICANT CHANGE UP
EOSINOPHIL # BLD AUTO: 0.13 K/UL — SIGNIFICANT CHANGE UP (ref 0–0.5)
EOSINOPHIL NFR BLD AUTO: 1.7 % — SIGNIFICANT CHANGE UP (ref 0–6)
GLUCOSE SERPL-MCNC: 88 MG/DL — SIGNIFICANT CHANGE UP (ref 70–99)
HCT VFR BLD CALC: 31.7 % — LOW (ref 39–50)
HGB BLD-MCNC: 10.5 G/DL — LOW (ref 13–17)
IMM GRANULOCYTES NFR BLD AUTO: 0.5 % — SIGNIFICANT CHANGE UP (ref 0–0.9)
LYMPHOCYTES # BLD AUTO: 1.32 K/UL — SIGNIFICANT CHANGE UP (ref 1–3.3)
LYMPHOCYTES # BLD AUTO: 16.8 % — SIGNIFICANT CHANGE UP (ref 13–44)
MCHC RBC-ENTMCNC: 29.4 PG — SIGNIFICANT CHANGE UP (ref 27–34)
MCHC RBC-ENTMCNC: 33.1 G/DL — SIGNIFICANT CHANGE UP (ref 32–36)
MCV RBC AUTO: 88.8 FL — SIGNIFICANT CHANGE UP (ref 80–100)
MONOCYTES # BLD AUTO: 0.72 K/UL — SIGNIFICANT CHANGE UP (ref 0–0.9)
MONOCYTES NFR BLD AUTO: 9.2 % — SIGNIFICANT CHANGE UP (ref 2–14)
MRSA PCR RESULT.: SIGNIFICANT CHANGE UP
NEUTROPHILS # BLD AUTO: 5.59 K/UL — SIGNIFICANT CHANGE UP (ref 1.8–7.4)
NEUTROPHILS NFR BLD AUTO: 71 % — SIGNIFICANT CHANGE UP (ref 43–77)
PLATELET # BLD AUTO: 186 K/UL — SIGNIFICANT CHANGE UP (ref 150–400)
POTASSIUM SERPL-MCNC: 4.3 MMOL/L — SIGNIFICANT CHANGE UP (ref 3.5–5.3)
POTASSIUM SERPL-SCNC: 4.3 MMOL/L — SIGNIFICANT CHANGE UP (ref 3.5–5.3)
RBC # BLD: 3.57 M/UL — LOW (ref 4.2–5.8)
RBC # FLD: 13.7 % — SIGNIFICANT CHANGE UP (ref 10.3–14.5)
S AUREUS DNA NOSE QL NAA+PROBE: SIGNIFICANT CHANGE UP
SODIUM SERPL-SCNC: 141 MMOL/L — SIGNIFICANT CHANGE UP (ref 135–145)
WBC # BLD: 7.86 K/UL — SIGNIFICANT CHANGE UP (ref 3.8–10.5)
WBC # FLD AUTO: 7.86 K/UL — SIGNIFICANT CHANGE UP (ref 3.8–10.5)

## 2025-01-15 PROCEDURE — 99232 SBSQ HOSP IP/OBS MODERATE 35: CPT

## 2025-01-15 PROCEDURE — 93010 ELECTROCARDIOGRAM REPORT: CPT

## 2025-01-15 RX ORDER — CHLORHEXIDINE GLUCONATE 1.2 MG/ML
1 RINSE ORAL DAILY
Refills: 0 | Status: DISCONTINUED | OUTPATIENT
Start: 2025-01-15 | End: 2025-01-17

## 2025-01-15 RX ORDER — METOPROLOL TARTRATE 50 MG
25 TABLET ORAL DAILY
Refills: 0 | Status: DISCONTINUED | OUTPATIENT
Start: 2025-01-15 | End: 2025-01-17

## 2025-01-15 RX ADMIN — Medication 500 MILLIGRAM(S): at 21:15

## 2025-01-15 RX ADMIN — TRAMADOL HYDROCHLORIDE 100 MILLIGRAM(S): 50 TABLET ORAL at 07:54

## 2025-01-15 RX ADMIN — Medication 200 MILLIGRAM(S): at 05:22

## 2025-01-15 RX ADMIN — ACETAMINOPHEN 975 MILLIGRAM(S): 80 SOLUTION/ DROPS ORAL at 05:22

## 2025-01-15 RX ADMIN — ACETAMINOPHEN 975 MILLIGRAM(S): 80 SOLUTION/ DROPS ORAL at 14:05

## 2025-01-15 RX ADMIN — GABAPENTIN 300 MILLIGRAM(S): 300 CAPSULE ORAL at 13:04

## 2025-01-15 RX ADMIN — TRAMADOL HYDROCHLORIDE 100 MILLIGRAM(S): 50 TABLET ORAL at 18:02

## 2025-01-15 RX ADMIN — ACETAMINOPHEN 975 MILLIGRAM(S): 80 SOLUTION/ DROPS ORAL at 21:15

## 2025-01-15 RX ADMIN — Medication 500 MILLIGRAM(S): at 13:04

## 2025-01-15 RX ADMIN — Medication 500 MILLIGRAM(S): at 05:22

## 2025-01-15 RX ADMIN — SENNOSIDES 2 TABLET(S): 8.6 TABLET, FILM COATED ORAL at 21:16

## 2025-01-15 RX ADMIN — CHLORHEXIDINE GLUCONATE 1 APPLICATION(S): 1.2 RINSE ORAL at 15:51

## 2025-01-15 RX ADMIN — Medication 25 MILLIGRAM(S): at 18:54

## 2025-01-15 RX ADMIN — ENOXAPARIN SODIUM 40 MILLIGRAM(S): 60 INJECTION INTRAVENOUS; SUBCUTANEOUS at 05:21

## 2025-01-15 RX ADMIN — ATORVASTATIN CALCIUM 40 MILLIGRAM(S): 40 TABLET, FILM COATED ORAL at 21:15

## 2025-01-15 RX ADMIN — TRAMADOL HYDROCHLORIDE 100 MILLIGRAM(S): 50 TABLET ORAL at 06:54

## 2025-01-15 RX ADMIN — GABAPENTIN 300 MILLIGRAM(S): 300 CAPSULE ORAL at 05:22

## 2025-01-15 RX ADMIN — TRAMADOL HYDROCHLORIDE 100 MILLIGRAM(S): 50 TABLET ORAL at 18:58

## 2025-01-15 RX ADMIN — ACETAMINOPHEN 975 MILLIGRAM(S): 80 SOLUTION/ DROPS ORAL at 13:05

## 2025-01-15 RX ADMIN — ACETAMINOPHEN 975 MILLIGRAM(S): 80 SOLUTION/ DROPS ORAL at 06:22

## 2025-01-15 RX ADMIN — Medication 200 MILLIGRAM(S): at 17:17

## 2025-01-15 RX ADMIN — PANTOPRAZOLE 40 MILLIGRAM(S): 40 TABLET, DELAYED RELEASE ORAL at 05:22

## 2025-01-15 RX ADMIN — Medication 200 MILLIGRAM(S): at 18:17

## 2025-01-15 RX ADMIN — GABAPENTIN 300 MILLIGRAM(S): 300 CAPSULE ORAL at 21:15

## 2025-01-15 RX ADMIN — Medication 200 MILLIGRAM(S): at 06:22

## 2025-01-15 NOTE — PROGRESS NOTE ADULT - ASSESSMENT
75 y/o male with Hx of CAD 2 stents, extrophy of bladder, s/p 30 urological surgeries, encephalopathy, s/p urostomy placed 2015, melanoma s/p removal x2 on neck and right cheek, presents with lower back pain severely worsening since Thanksgiving. He has had chronic lower back pain for several years. He was seeing pain management Dr. Marley for this who has provided him with multiple medications including narcotics, oxycodone, tramadol, hydrocodone, etc. He began to experience excruciating pain in the right lower extremity L5/S1 distribution. Patient has had an epidural injection and this had improved symptoms. He has Hx of posterior cervical laminectomy fusion for what sounds to be a cervical myelopathy. He has had residual numbness tingling in the bilateral lower extremities which has only moderately improved and was more intermittent in nature. He did have some difficulties with balance which has been chronic in nature long-term since previous cervical fusion. He found improvement from previous injection by Dr. Marley for approximately 24 hours and then pain subsequently returned. He has lower back pain but primarily right lower extremity radiculopathy, he was unable to weight-bear/stand for prolonged period of time secondary to worsening pain and unable to walk for prolonged periods. Patient came in here for elective L2-L5 laminectomy on 1/13/2025 with Dr. Guzman s/p procedure. On 1/14/25, patient had RRT secondary to vasovagal syncope with 7second and 4second pauses on telemetry.  EP consulted, no need for PPM at this time.      Plan:     Lower back pain s/p  L2-L5 laminectomy post op day 2:     - post op no complications  - VS stable  - abx per ortho    - c/w IVF x 24 hrs then reassess per ortho  - opiate induced constipation regimen   - encouraging incentive spirometry   -c/w local wound care per ortho   -DVT prophylaxis and Pain meds as per Ortho team and pain management   -PT/OT and weight bearing per ortho   -Continue methocarbamol and gabapentin      Vasovagal syncope with pauses 7second and 4second  -Metoprolol held.  -Cardiology and EP consulted - no need for PPM at this time.  -TTE done - pending read.  -Check orthostatics.  -Monitor on telemetry.  -TSH WNL, F/u lyme titers.    HTN: metoprolol succinate 25 mg once a day (at bedtime) held secondary to pauses.    Hx of CAD: will continue with aspirin 81 mg once a day, when okay with Ortho.     Hx of HLD: will continue with atorvastatin 40 mg once a day (at bedtime).     Intermittent nocturnal hypoxia   Recommend outpatient sleep study to r/o VALENTINE.  Discussed with patient.    DVT prophylaxis: Lovenox as per Ortho.

## 2025-01-15 NOTE — PHYSICAL THERAPY INITIAL EVALUATION ADULT - MANUAL MUSCLE TESTING RESULTS, REHAB EVAL
right ankle with foot drop noted (DF 2+/5), right hip and knee 3-/5. Left LE grossly WFL
right ankle with foot drop noted (DF 2+/5), right hip and knee 3-/5. Left LE grossly WFL

## 2025-01-15 NOTE — PROGRESS NOTE ADULT - SUBJECTIVE AND OBJECTIVE BOX
Patient seen today in bed. No overnight complaints    TELE: SR with rates 70-80s. No events overnight.     MEDICATIONS  (STANDING):  acetaminophen     Tablet .. 975 milliGRAM(s) Oral every 8 hours  atorvastatin 40 milliGRAM(s) Oral at bedtime  celecoxib 200 milliGRAM(s) Oral every 12 hours  chlorhexidine 2% Cloths 1 Application(s) Topical daily  enoxaparin Injectable 40 milliGRAM(s) SubCutaneous every 24 hours  gabapentin 300 milliGRAM(s) Oral every 8 hours  lactated ringers. 1000 milliLiter(s) (80 mL/Hr) IV Continuous <Continuous>  methocarbamol 500 milliGRAM(s) Oral every 8 hours  pantoprazole    Tablet 40 milliGRAM(s) Oral before breakfast  senna 2 Tablet(s) Oral at bedtime  sodium chloride 0.9% Bolus 1000 milliLiter(s) IV Bolus once    MEDICATIONS  (PRN):  aluminum hydroxide/magnesium hydroxide/simethicone Suspension 30 milliLiter(s) Oral every 12 hours PRN Indigestion  magnesium hydroxide Suspension 30 milliLiter(s) Oral every 12 hours PRN Constipation  naloxone Injectable 0.1 milliGRAM(s) IV Push every 3 minutes PRN For ANY of the following changes in patient status:  A. RR LESS THAN 10 breaths per minute, B. Oxygen saturation LESS THAN 90%, C. 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  Encephalopathy in sepsis  due to urinary disorder needed surgery diversion of ureter 2014  CAD (coronary artery disease)  H/O neuropathy  Hyperlipidemia  DVT, lower extremity  HTN (hypertension)  History of urinary diversion procedure  with urostomy  April 2014 with bag  H/O laminectomy  History of bladder surgery  Stented coronary artery    Vital Signs Last 24 Hrs  T(C): 36.5 (15 Chava 2025 08:00), Max: 36.7 (15 Chava 2025 04:02)  T(F): 97.7 (15 Chvaa 2025 08:00), Max: 98 (15 Chava 2025 04:02)  HR: 77 (15 Chava 2025 08:00) (62 - 78)  BP: 110/78 (15 Chava 2025 08:00) (92/59 - 118/76)  BP(mean): 87 (15 Chava 2025 08:00) (72 - 91)  RR: 17 (15 Chava 2025 08:00) (16 - 18)  SpO2: 95% (15 Chava 2025 08:00) (92% - 99%)    Physical Exam:  Constitutional: AAOx3, NAD,   Neck: supple, No JVD  Cardiovascular: +S1S2 RRR, no murmurs, rubs, gallops   Pulmonary: CTA b/l, unlabored, no wheezes, rales. No rhonchi  Abdomen: +BS, soft NTND  Extremities: no edema b/l,   Neuro: non focal, speech clear, PARDO x 4    LABS:                        10.5   7.86  )-----------( 186      ( 15 Chava 2025 06:15 )             31.7     141  |  109[H]  |  16.8  ----------------------------<  88  4.3   |  24.0  |  0.67  Ca    8.7      15 Chava 2025 06:15  Mg     1.9     01-14  TPro  5.4[L]  /  Alb  3.5  /  TBili  0.5  /  DBili  x   /  AST  22  /  ALT  11  /  AlkPhos  66  01-14  PT/INR - ( 14 Jan 2025 10:41 )   PT: 12.5 sec;   INR: 1.11 ratio    PTT - ( 14 Jan 2025 10:41 )  PTT:32.5 sec    A/P  74 year old male with CAD s/p AW MI s/p TNK + PCI/stents to LAD (4/11/2004), GERD, HTN, HLD, extrophy of bladder, s/p 30 urological surgeries, s/p urostomy placed 2015, melanoma s/p removal x2 who is POD#2 s/p L2-L5 laminectomy. EP consulted regarding transient pause of 7.92 seconds today.     No overnight telemetry events  TTE performed but pending read  Pause vagally mediated.     - Cardiology follow up appreciated - continue holding beta blockers.   - No current indication for urgent pacemaker at this time. Would recommend follow up with Dr. Velasquez upon discharge to potentially discuss MCOT or long term monitoring with ILR.   - EP will sign off at this time. Recall prn

## 2025-01-15 NOTE — PHYSICAL THERAPY INITIAL EVALUATION ADULT - GENERAL OBSERVATIONS, REHAB EVAL
Pt received in bed +IV +VCB +PCA +urinary catheter on room air, pleasant and cooperative
Pt received in bed +IV +VCB +PCA +urinary catheter on room air, pleasant and cooperative

## 2025-01-15 NOTE — PHYSICAL THERAPY INITIAL EVALUATION ADULT - BED MOBILITY LIMITATIONS, REHAB EVAL
impaired ability to control trunk for mobility
decreased ability to use arms for pushing/pulling/decreased ability to use legs for bridging/pushing

## 2025-01-15 NOTE — CHART NOTE - NSCHARTNOTEFT_GEN_A_CORE
Patient was working with PT, his heart rate when up to 160s  some irregular rhythm  he was asymptomatic  Spoke with EP team and they reviewed cardiac monitor as per them seems like Atrial tachycardia  recommended resuming his BB  they will follow along.   EKG ordered as well Lab for am

## 2025-01-15 NOTE — PHYSICAL THERAPY INITIAL EVALUATION ADULT - ADDITIONAL COMMENTS
Pt reports living with spouse in a house/   apartment with 1 DINA c no rail, and resides on the main level. Pt amb with RW (was using SAC, however reports recent decline) and is independent with functional mobility, ADLs, and IADLs. Pt drives and is retired. Pt has support of family. Pt owns RW and SAC.
Pt reports living with spouse in a house/   apartment with 1 DINA c no rail, and resides on the main level. Pt amb with RW (was using SAC, however reports recent decline) and is independent with functional mobility, ADLs, and IADLs. Pt drives and is retired. Pt has support of family. Pt owns RW and SAC.

## 2025-01-15 NOTE — PHYSICAL THERAPY INITIAL EVALUATION ADULT - PERTINENT HX OF CURRENT PROBLEM, REHAB EVAL
73 y/o male with Hx of CAD 2 stents, extrophy of bladder, s/p 30 urological surgeries, encephalopathy, s/p urostomy placed 2015, melanoma s/p removal x2 on neck and right cheek, he has lower back pain severely worsening sine thanksgiving, he has had chronic lower back pain for several s/p  L2-L5 laminectomy.
73 y/o male with Hx of CAD 2 stents, extrophy of bladder, s/p 30 urological surgeries, encephalopathy, s/p urostomy placed 2015, melanoma s/p removal x2 on neck and right cheek, he has lower back pain severely worsening sine thanksgiving, he has had chronic lower back pain for several s/p  L2-L5 laminectomy

## 2025-01-15 NOTE — PHYSICAL THERAPY INITIAL EVALUATION ADULT - PLANNED THERAPY INTERVENTIONS, PT EVAL
stairs/bed mobility training/gait training/transfer training
balance training/bed mobility training/gait training/strengthening/transfer training

## 2025-01-15 NOTE — PROGRESS NOTE ADULT - SUBJECTIVE AND OBJECTIVE BOX
Interval Hx:  Patient seen during rounds  Patient reports pain to be controlled on current medications  Patient denies sedation with medications        Analgesic Dosing for past 24 hours reviewed as below:    acetaminophen     Tablet ..   975 milliGRAM(s) Oral (01-15-25 @ 05:22)   975 milliGRAM(s) Oral (01-14-25 @ 21:49)   975 milliGRAM(s) Oral (01-14-25 @ 14:20)    acetaminophen   IVPB ..   400 mL/Hr IV Intermittent (01-14-25 @ 10:42)    celecoxib   200 milliGRAM(s) Oral (01-15-25 @ 05:22)   200 milliGRAM(s) Oral (01-14-25 @ 19:41)    gabapentin   300 milliGRAM(s) Oral (01-15-25 @ 05:22)   300 milliGRAM(s) Oral (01-14-25 @ 21:49)   300 milliGRAM(s) Oral (01-14-25 @ 14:20)    methocarbamol   500 milliGRAM(s) Oral (01-15-25 @ 05:22)   500 milliGRAM(s) Oral (01-14-25 @ 21:49)   500 milliGRAM(s) Oral (01-14-25 @ 14:19)    traMADol   50 milliGRAM(s) Oral (01-14-25 @ 10:43)    traMADol   100 milliGRAM(s) Oral (01-15-25 @ 06:54)   100 milliGRAM(s) Oral (01-14-25 @ 18:08)          T(C): 36.5 (01-15-25 @ 07:50), Max: 36.7 (01-15-25 @ 04:02)  HR: 62 (01-15-25 @ 06:00) (62 - 78)  BP: 108/56 (01-15-25 @ 06:00) (92/59 - 118/76)  RR: 16 (01-15-25 @ 06:00) (16 - 18)  SpO2: 96% (01-15-25 @ 06:00) (92% - 99%)      01-14-25 @ 07:01  -  01-15-25 @ 07:00  --------------------------------------------------------  IN: 390 mL / OUT: 965 mL / NET: -575 mL        acetaminophen     Tablet .. 975 milliGRAM(s) Oral every 8 hours  aluminum hydroxide/magnesium hydroxide/simethicone Suspension 30 milliLiter(s) Oral every 12 hours PRN  atorvastatin 40 milliGRAM(s) Oral at bedtime  celecoxib 200 milliGRAM(s) Oral every 12 hours  enoxaparin Injectable 40 milliGRAM(s) SubCutaneous every 24 hours  gabapentin 300 milliGRAM(s) Oral every 8 hours  lactated ringers. 1000 milliLiter(s) IV Continuous <Continuous>  magnesium hydroxide Suspension 30 milliLiter(s) Oral every 12 hours PRN  methocarbamol 500 milliGRAM(s) Oral every 8 hours  naloxone Injectable 0.1 milliGRAM(s) IV Push every 3 minutes PRN  ondansetron Injectable 4 milliGRAM(s) IV Push every 6 hours PRN  ondansetron Injectable 4 milliGRAM(s) IV Push every 6 hours PRN  ondansetron Injectable 4 milliGRAM(s) IV Push every 6 hours PRN  pantoprazole    Tablet 40 milliGRAM(s) Oral before breakfast  senna 2 Tablet(s) Oral at bedtime  sodium chloride 0.9% Bolus 1000 milliLiter(s) IV Bolus once  traMADol 25 milliGRAM(s) Oral every 4 hours PRN  traMADol 50 milliGRAM(s) Oral every 4 hours PRN  traMADol 100 milliGRAM(s) Oral every 6 hours PRN                          10.5   7.86  )-----------( 186      ( 15 Chava 2025 06:15 )             31.7     01-15    141  |  109[H]  |  16.8  ----------------------------<  88  4.3   |  24.0  |  0.67    Ca    8.7      15 Chava 2025 06:15  Mg     1.9     01-14    TPro  5.4[L]  /  Alb  3.5  /  TBili  0.5  /  DBili  x   /  AST  22  /  ALT  11  /  AlkPhos  66  01-14    PT/INR - ( 14 Jan 2025 10:41 )   PT: 12.5 sec;   INR: 1.11 ratio         PTT - ( 14 Jan 2025 10:41 )  PTT:32.5 sec  Urinalysis Basic - ( 15 Chava 2025 06:15 )    Color: x / Appearance: x / SG: x / pH: x  Gluc: 88 mg/dL / Ketone: x  / Bili: x / Urobili: x   Blood: x / Protein: x / Nitrite: x   Leuk Esterase: x / RBC: x / WBC x   Sq Epi: x / Non Sq Epi: x / Bacteria: x        Pain Service   [phone]

## 2025-01-15 NOTE — PROGRESS NOTE ADULT - SUBJECTIVE AND OBJECTIVE BOX
Patient seen and eval at bedside. Patient has no complaints, states his symptoms are greatly improved since surgery. Denies CP, SOB, dizziness, numbness/tingling. Patient does note cramp like feeling in lateral aspect of right calf and heaviness sensation when trying to lift the right leg but much improved to what it was before surgery.    Vital Signs Last 24 Hrs  T(C): 36.5 (15 Chava 2025 07:50), Max: 36.7 (15 Chava 2025 04:02)  T(F): 97.7 (15 Chava 2025 07:50), Max: 98 (15 Chava 2025 04:02)  HR: 62 (15 Chava 2025 06:00) (62 - 78)  BP: 108/56 (15 Chava 2025 06:00) (92/59 - 118/76)  BP(mean): 72 (15 Chava 2025 06:00) (72 - 91)  RR: 16 (15 Chava 2025 06:00) (16 - 18)  SpO2: 96% (15 Chava 2025 06:00) (92% - 99%)    PE: NAD, alert awake  Back: Dressing C/D/I, no bleeding or drainage  HV drain output over 12 hrs = 30ccs  Motor exam:           Lower extremeity                             HF         KE          TA       EHL         GS                                                 R        4/5        4+/5     5/5       5/5         5/5                                               L         5/5        5/5       5/5       5/5         5/5    SILT C5-T1 B/L, L2-S1 intact B/L, DP pulses 2+ B/L  Calf soft, NT B/L    A/P: s/p lumbar lami L3-5 POD#2, syncopal episode post op  ·	Pain control - PM following  ·	DVT propx  ·	PT - WBAT  ·	Cont drain - monitor output  ·	cardio/EP following   ·	Bowel regimen  ·	Med following

## 2025-01-15 NOTE — DIETITIAN INITIAL EVALUATION ADULT - PERTINENT LABORATORY DATA
01-15    141  |  109[H]  |  16.8  ----------------------------<  88  4.3   |  24.0  |  0.67    Ca    8.7      15 Chava 2025 06:15  Mg     1.9     01-14    TPro  5.4[L]  /  Alb  3.5  /  TBili  0.5  /  DBili  x   /  AST  22  /  ALT  11  /  AlkPhos  66  01-14  A1C with Estimated Average Glucose Result: 5.6 % (12-31-24 @ 12:03)

## 2025-01-15 NOTE — DIETITIAN INITIAL EVALUATION ADULT - ORAL INTAKE PTA/DIET HISTORY
nutrition consult received for assessment/education. Spoke with pt this AM. Reported good po intake in house and PTA. UBW: 167 lbs. RD bed scale weight 165 lbs. No known recent weight change as per pt. Eats fairly well at home, pt and spouse rarely cook with any salt, and eat meat once per week. Last BM documented 1/12. NKFA. Nutrition/diet education declined. RD to remain available.

## 2025-01-15 NOTE — PHYSICAL THERAPY INITIAL EVALUATION ADULT - PASSIVE RANGE OF MOTION EXAMINATION, REHAB EVAL
No bilateral upper extremity Passive ROM was WFL (within functional limits)/bilateral lower extremity Passive ROM was WFL (within functional limits)

## 2025-01-15 NOTE — PHYSICAL THERAPY INITIAL EVALUATION ADULT - GAIT DISTANCE, PT EVAL
distance limited by pt's HR to 170s upon standing. assisted back to bed per nurse./chair to bed
6 steps at bedside, limited by lines

## 2025-01-15 NOTE — DIETITIAN INITIAL EVALUATION ADULT - ADD RECOMMEND
- Monitor weights daily for trend/accuracy   - Continue diet as tolerated.   - Rx MVI daily, vit C 500mg

## 2025-01-15 NOTE — PROGRESS NOTE ADULT - ASSESSMENT
74-year-old male presents to PST with PMH of CAD 2 stents, extrophy of bladder, s/p 30 urological surgeries, encephalopathy, s/p urostomy placed 2015, melanoma s/p removal x2 on neck and right cheek, who presents to [facility] for a scheduled  L2-L5 laminectomy. Pain management was consulted for pain control optimization.  RRT this am, now mentating well  pain controlled at this time    Plan:  - cont tramadol 25/50/100 prn, max daily dose = 400mg  - If no contraindication to NSAIDs, cont celebrex 200mg PO q12h x 5days, with food. HOLD for black/red stools.  - change  robaxin 500mg TID prn spasms  - lidocaine   Patch 12 hours on, 12 hours off. Max 3 patches on at one time    if leg pain remains controlled, can DC gabapentin   74-year-old male presents to PST with PMH of CAD 2 stents, extrophy of bladder, s/p 30 urological surgeries, encephalopathy, s/p urostomy placed 2015, melanoma s/p removal x2 on neck and right cheek, who presents to [facility] for a scheduled  L2-L5 laminectomy. Pain management was consulted for pain control optimization.  RRT this am, now mentating well  pain controlled at this time    Plan:  - C/wtramadol 25/50/100 prn, max daily dose = 400mg  - If no contraindication to NSAIDs, cont celebrex 200mg PO q12h x 5days, with food. HOLD for black/red stools.  - Change  robaxin 500mg TID prn spasms  - lidocaine   Patch 12 hours on, 12 hours off. Max 3 patches on at one time      Pain controlled  Pain service will sign off  Please reconsult as needed

## 2025-01-15 NOTE — DIETITIAN INITIAL EVALUATION ADULT - OTHER INFO
75 y/o male here for elective L2-L5 laminectomy on 1/13/2025 with Dr. Guzman s/p procedure. On 1/14/25, patient had RRT secondary to vasovagal syncope.   #Lower back pain s/p  L2-L5 laminectomy POD# 2

## 2025-01-15 NOTE — PHYSICAL THERAPY INITIAL EVALUATION ADULT - CRITERIA FOR SKILLED THERAPEUTIC INTERVENTIONS
impairments found/functional limitations in following categories
impairments found/functional limitations in following categories/risk reduction/prevention/rehab potential/therapy frequency/anticipated equipment needs at discharge/anticipated discharge recommendation

## 2025-01-15 NOTE — DIETITIAN INITIAL EVALUATION ADULT - PERTINENT MEDS FT
MEDICATIONS  (STANDING):  lactated ringers. 1000 milliLiter(s) (80 mL/Hr) IV Continuous <Continuous>  senna 2 Tablet(s) Oral at bedtime  sodium chloride 0.9% Bolus 1000 milliLiter(s) IV Bolus once

## 2025-01-15 NOTE — PROGRESS NOTE ADULT - SUBJECTIVE AND OBJECTIVE BOX
CC: Laminectomy (14 Jan 2025 13:46)    HPI:  73 y/o male with Hx of CAD 2 stents, extrophy of bladder, s/p 30 urological surgeries, encephalopathy, s/p urostomy placed 2015, melanoma s/p removal x2 on neck and right cheek, presents with lower back pain severely worsening since Thanksgiving. He has had chronic lower back pain for several years. He was seeing pain management Dr. Marley for this who has provided him with multiple medications including narcotics, oxycodone, tramadol, hydrocodone, etc. He began to experience excruciating pain in the right lower extremity L5/S1 distribution. Patient has had an epidural injection and this had improved symptoms. Patient has a Hx of posterior cervical laminectomy fusion for what sounds to be a cervical myelopathy. He has had residual numbness tingling in the bilateral lower extremities which was only moderately improved and was more intermittent in nature. He did have some difficulties with balance which has been chronic in nature long-term since previous cervical fusion. He found improvement from previous injection by Dr. Marley for approximately 24 hours and then pain subsequently returned. Patient had lower back pain but primarily right lower extremity radiculopathy. Patient was unable to weight-bear/stand for prolonged period of time secondary to worsening pain and was unable to walk for prolonged periods. He came in here for elective L2-L5 laminectomy on 1/13/2025 with Dr. Guzman s/p procedure.     INTERVAL HPI/OVERNIGHT EVENTS:  Patient seen and examined lying in bed.  Patient reports pain controlled.  Patient denies any headache, dizziness, SOB, CP, abdominal pain, nausea, vomiting.  Other ROS reviewed and are negative.  Telemetry reviewed, no further pauses.      Vital Signs Last 24 Hrs  T(C): 36.5 (15 Chava 2025 07:50), Max: 36.7 (15 Chava 2025 04:02)  T(F): 97.7 (15 Chava 2025 07:50), Max: 98 (15 Chava 2025 04:02)  HR: 62 (15 Chava 2025 06:00) (62 - 78)  BP: 108/56 (15 Chava 2025 06:00) (92/59 - 118/76)  BP(mean): 72 (15 Chava 2025 06:00) (72 - 91)  RR: 16 (15 Chava 2025 06:00) (16 - 18)  SpO2: 96% (15 Chava 2025 06:00) (92% - 99%)    Parameters below as of 15 Chava 2025 06:00  Patient On (Oxygen Delivery Method): room air      I&O's Detail    14 Jan 2025 07:01  -  15 Chava 2025 07:00  --------------------------------------------------------  IN:    Oral Fluid: 390 mL  Total IN: 390 mL    OUT:    Bulb (mL): 65 mL    Urostomy (mL): 900 mL  Total OUT: 965 mL    Total NET: -575 mL    PHYSICAL EXAM:  GENERAL: NAD  HEAD:  Atraumatic, Normocephalic  NECK: Supple, No JVD, Normal thyroid  NERVOUS SYSTEM:  Alert & Oriented X3, Good concentration; Motor Strength 5/5 B/L upper and lower extremities  CHEST/LUNG: Clear to auscultation bilaterally  HEART: Regular rate and rhythm; No murmurs, rubs, or gallops  ABDOMEN: Soft, Nontender, Nondistended; Bowel sounds present, (+) urostomy  EXTREMITIES:  2+ Peripheral Pulses, No clubbing, cyanosis, or edema  SKIN: Lower back with clean dressing and KOMAL x1        CARDIAC MARKERS ( 14 Jan 2025 11:05 )  x     / x     / x     / x     / 7.0 ng/mL                            10.5   7.86  )-----------( 186      ( 15 Chava 2025 06:15 )             31.7     15 Chava 2025 06:15    141    |  109    |  16.8   ----------------------------<  88     4.3     |  24.0   |  0.67     Ca    8.7        15 Chava 2025 06:15  Mg     1.9       14 Jan 2025 10:41    TPro  5.4    /  Alb  3.5    /  TBili  0.5    /  DBili  x      /  AST  22     /  ALT  11     /  AlkPhos  66     14 Jan 2025 10:41    PT/INR - ( 14 Jan 2025 10:41 )   PT: 12.5 sec;   INR: 1.11 ratio         PTT - ( 14 Jan 2025 10:41 )  PTT:32.5 sec    CAPILLARY BLOOD GLUCOSE  POCT Blood Glucose.: 119 mg/dL (14 Jan 2025 10:34)    LIVER FUNCTIONS - ( 14 Jan 2025 10:41 )  Alb: 3.5 g/dL / Pro: 5.4 g/dL / ALK PHOS: 66 U/L / ALT: 11 U/L / AST: 22 U/L / GGT: x           Urinalysis Basic - ( 15 Chava 2025 06:15 )    Color: x / Appearance: x / SG: x / pH: x  Gluc: 88 mg/dL / Ketone: x  / Bili: x / Urobili: x   Blood: x / Protein: x / Nitrite: x   Leuk Esterase: x / RBC: x / WBC x   Sq Epi: x / Non Sq Epi: x / Bacteria: x        MEDICATIONS  (STANDING):  acetaminophen     Tablet .. 975 milliGRAM(s) Oral every 8 hours  atorvastatin 40 milliGRAM(s) Oral at bedtime  celecoxib 200 milliGRAM(s) Oral every 12 hours  enoxaparin Injectable 40 milliGRAM(s) SubCutaneous every 24 hours  gabapentin 300 milliGRAM(s) Oral every 8 hours  lactated ringers. 1000 milliLiter(s) (80 mL/Hr) IV Continuous <Continuous>  methocarbamol 500 milliGRAM(s) Oral every 8 hours  pantoprazole    Tablet 40 milliGRAM(s) Oral before breakfast  senna 2 Tablet(s) Oral at bedtime  sodium chloride 0.9% Bolus 1000 milliLiter(s) IV Bolus once    MEDICATIONS  (PRN):  aluminum hydroxide/magnesium hydroxide/simethicone Suspension 30 milliLiter(s) Oral every 12 hours PRN Indigestion  magnesium hydroxide Suspension 30 milliLiter(s) Oral every 12 hours PRN Constipation  naloxone Injectable 0.1 milliGRAM(s) IV Push every 3 minutes PRN For ANY of the following changes in patient status:  A. RR LESS THAN 10 breaths per minute, B. Oxygen saturation LESS THAN 90%, C. Sedation score of 6  ondansetron Injectable 4 milliGRAM(s) IV Push every 6 hours PRN Nausea  ondansetron Injectable 4 milliGRAM(s) IV Push every 6 hours PRN Nausea and/or Vomiting  ondansetron Injectable 4 milliGRAM(s) IV Push every 6 hours PRN Nausea and/or Vomiting  traMADol 25 milliGRAM(s) Oral every 4 hours PRN Mild Pain (1 - 3)  traMADol 50 milliGRAM(s) Oral every 4 hours PRN Moderate Pain (4 - 6)  traMADol 100 milliGRAM(s) Oral every 6 hours PRN Severe Pain (7 - 10)       CC: Laminectomy (14 Jan 2025 13:46)    INTERVAL HPI/OVERNIGHT EVENTS:  Patient seen and examined lying in bed.  Patient reports pain controlled.  Patient denies any headache, dizziness, SOB, CP, abdominal pain, nausea, vomiting.  Other ROS reviewed and are negative.  Telemetry reviewed, no further pauses.      Vital Signs Last 24 Hrs  T(C): 36.5 (15 Chava 2025 07:50), Max: 36.7 (15 Chava 2025 04:02)  T(F): 97.7 (15 Chava 2025 07:50), Max: 98 (15 Chava 2025 04:02)  HR: 62 (15 Chava 2025 06:00) (62 - 78)  BP: 108/56 (15 Chava 2025 06:00) (92/59 - 118/76)  BP(mean): 72 (15 Chava 2025 06:00) (72 - 91)  RR: 16 (15 Chava 2025 06:00) (16 - 18)  SpO2: 96% (15 Chava 2025 06:00) (92% - 99%)    Parameters below as of 15 Chava 2025 06:00  Patient On (Oxygen Delivery Method): room air      I&O's Detail    14 Jan 2025 07:01  -  15 Chava 2025 07:00  --------------------------------------------------------  IN:    Oral Fluid: 390 mL  Total IN: 390 mL    OUT:    Bulb (mL): 65 mL    Urostomy (mL): 900 mL  Total OUT: 965 mL    Total NET: -575 mL    PHYSICAL EXAM:  GENERAL: NAD  HEAD:  Atraumatic, Normocephalic  NECK: Supple, No JVD, Normal thyroid  NERVOUS SYSTEM:  Alert & Oriented X3  CHEST/LUNG: Clear to auscultation bilaterally  HEART: Regular rate and rhythm; No murmurs  ABDOMEN: Soft, Nontender, Nondistended; Bowel sounds present, (+) urostomy  EXTREMITIES:  2+ Peripheral Pulses, No clubbing, cyanosis, or edema  SKIN: Lower back with clean dressing and KOMAL x1                            10.5   7.86  )-----------( 186      ( 15 Chava 2025 06:15 )             31.7     15 Jan 2025 06:15    141    |  109    |  16.8   ----------------------------<  88     4.3     |  24.0   |  0.67     Ca    8.7        15 Jan 2025 06:15  Mg     1.9       14 Jan 2025 10:41    TPro  5.4    /  Alb  3.5    /  TBili  0.5    /  DBili  x      /  AST  22     /  ALT  11     /  AlkPhos  66     14 Jan 2025 10:41    PT/INR - ( 14 Jan 2025 10:41 )   PT: 12.5 sec;   INR: 1.11 ratio         PTT - ( 14 Jan 2025 10:41 )  PTT:32.5 sec    CAPILLARY BLOOD GLUCOSE  POCT Blood Glucose.: 119 mg/dL (14 Jan 2025 10:34)    LIVER FUNCTIONS - ( 14 Jan 2025 10:41 )  Alb: 3.5 g/dL / Pro: 5.4 g/dL / ALK PHOS: 66 U/L / ALT: 11 U/L / AST: 22 U/L / GGT: x             MEDICATIONS  (STANDING):    acetaminophen     Tablet .. 975 milliGRAM(s) Oral every 8 hours  atorvastatin 40 milliGRAM(s) Oral at bedtime  celecoxib 200 milliGRAM(s) Oral every 12 hours  enoxaparin Injectable 40 milliGRAM(s) SubCutaneous every 24 hours  gabapentin 300 milliGRAM(s) Oral every 8 hours  lactated ringers. 1000 milliLiter(s) (80 mL/Hr) IV Continuous <Continuous>  methocarbamol 500 milliGRAM(s) Oral every 8 hours  pantoprazole    Tablet 40 milliGRAM(s) Oral before breakfast  senna 2 Tablet(s) Oral at bedtime  sodium chloride 0.9% Bolus 1000 milliLiter(s) IV Bolus once    MEDICATIONS  (PRN):  aluminum hydroxide/magnesium hydroxide/simethicone Suspension 30 milliLiter(s) Oral every 12 hours PRN Indigestion  magnesium hydroxide Suspension 30 milliLiter(s) Oral every 12 hours PRN Constipation  naloxone Injectable 0.1 milliGRAM(s) IV Push every 3 minutes PRN For ANY of the following changes in patient status:  A. RR LESS THAN 10 breaths per minute, B. Oxygen saturation LESS THAN 90%, C. Sedation score of 6  ondansetron Injectable 4 milliGRAM(s) IV Push every 6 hours PRN Nausea  ondansetron Injectable 4 milliGRAM(s) IV Push every 6 hours PRN Nausea and/or Vomiting  ondansetron Injectable 4 milliGRAM(s) IV Push every 6 hours PRN Nausea and/or Vomiting  traMADol 25 milliGRAM(s) Oral every 4 hours PRN Mild Pain (1 - 3)  traMADol 50 milliGRAM(s) Oral every 4 hours PRN Moderate Pain (4 - 6)  traMADol 100 milliGRAM(s) Oral every 6 hours PRN Severe Pain (7 - 10)

## 2025-01-16 LAB
ALBUMIN SERPL ELPH-MCNC: 3 G/DL — LOW (ref 3.3–5.2)
ALP SERPL-CCNC: 56 U/L — SIGNIFICANT CHANGE UP (ref 40–120)
ALT FLD-CCNC: 11 U/L — SIGNIFICANT CHANGE UP
ANION GAP SERPL CALC-SCNC: 8 MMOL/L — SIGNIFICANT CHANGE UP (ref 5–17)
AST SERPL-CCNC: 15 U/L — SIGNIFICANT CHANGE UP
B BURGDOR C6 AB SER-ACNC: NEGATIVE — SIGNIFICANT CHANGE UP
B BURGDOR IGG+IGM SER-ACNC: 0.05 INDEX — SIGNIFICANT CHANGE UP (ref 0.01–0.9)
BASOPHILS # BLD AUTO: 0.04 K/UL — SIGNIFICANT CHANGE UP (ref 0–0.2)
BASOPHILS NFR BLD AUTO: 0.5 % — SIGNIFICANT CHANGE UP (ref 0–2)
BILIRUB SERPL-MCNC: 0.3 MG/DL — LOW (ref 0.4–2)
BUN SERPL-MCNC: 14.7 MG/DL — SIGNIFICANT CHANGE UP (ref 8–20)
CALCIUM SERPL-MCNC: 8.6 MG/DL — SIGNIFICANT CHANGE UP (ref 8.4–10.5)
CHLORIDE SERPL-SCNC: 106 MMOL/L — SIGNIFICANT CHANGE UP (ref 96–108)
CO2 SERPL-SCNC: 25 MMOL/L — SIGNIFICANT CHANGE UP (ref 22–29)
CREAT SERPL-MCNC: 0.62 MG/DL — SIGNIFICANT CHANGE UP (ref 0.5–1.3)
EGFR: 99 ML/MIN/1.73M2 — SIGNIFICANT CHANGE UP
EOSINOPHIL # BLD AUTO: 0.19 K/UL — SIGNIFICANT CHANGE UP (ref 0–0.5)
EOSINOPHIL NFR BLD AUTO: 2.5 % — SIGNIFICANT CHANGE UP (ref 0–6)
GLUCOSE SERPL-MCNC: 97 MG/DL — SIGNIFICANT CHANGE UP (ref 70–99)
HCT VFR BLD CALC: 32.1 % — LOW (ref 39–50)
HGB BLD-MCNC: 10.3 G/DL — LOW (ref 13–17)
IMM GRANULOCYTES NFR BLD AUTO: 0.3 % — SIGNIFICANT CHANGE UP (ref 0–0.9)
LYMPHOCYTES # BLD AUTO: 1.43 K/UL — SIGNIFICANT CHANGE UP (ref 1–3.3)
LYMPHOCYTES # BLD AUTO: 18.6 % — SIGNIFICANT CHANGE UP (ref 13–44)
MAGNESIUM SERPL-MCNC: 1.6 MG/DL — SIGNIFICANT CHANGE UP (ref 1.6–2.6)
MCHC RBC-ENTMCNC: 28.9 PG — SIGNIFICANT CHANGE UP (ref 27–34)
MCHC RBC-ENTMCNC: 32.1 G/DL — SIGNIFICANT CHANGE UP (ref 32–36)
MCV RBC AUTO: 89.9 FL — SIGNIFICANT CHANGE UP (ref 80–100)
MONOCYTES # BLD AUTO: 0.68 K/UL — SIGNIFICANT CHANGE UP (ref 0–0.9)
MONOCYTES NFR BLD AUTO: 8.9 % — SIGNIFICANT CHANGE UP (ref 2–14)
NEUTROPHILS # BLD AUTO: 5.32 K/UL — SIGNIFICANT CHANGE UP (ref 1.8–7.4)
NEUTROPHILS NFR BLD AUTO: 69.2 % — SIGNIFICANT CHANGE UP (ref 43–77)
PLATELET # BLD AUTO: 214 K/UL — SIGNIFICANT CHANGE UP (ref 150–400)
POTASSIUM SERPL-MCNC: 4.1 MMOL/L — SIGNIFICANT CHANGE UP (ref 3.5–5.3)
POTASSIUM SERPL-SCNC: 4.1 MMOL/L — SIGNIFICANT CHANGE UP (ref 3.5–5.3)
PROT SERPL-MCNC: 5.6 G/DL — LOW (ref 6.6–8.7)
RBC # BLD: 3.57 M/UL — LOW (ref 4.2–5.8)
RBC # FLD: 13.5 % — SIGNIFICANT CHANGE UP (ref 10.3–14.5)
SODIUM SERPL-SCNC: 138 MMOL/L — SIGNIFICANT CHANGE UP (ref 135–145)
WBC # BLD: 7.68 K/UL — SIGNIFICANT CHANGE UP (ref 3.8–10.5)
WBC # FLD AUTO: 7.68 K/UL — SIGNIFICANT CHANGE UP (ref 3.8–10.5)

## 2025-01-16 PROCEDURE — 99232 SBSQ HOSP IP/OBS MODERATE 35: CPT

## 2025-01-16 RX ORDER — MAGNESIUM SULFATE 500 MG/ML
2 INJECTION, SOLUTION INTRAMUSCULAR; INTRAVENOUS ONCE
Refills: 0 | Status: COMPLETED | OUTPATIENT
Start: 2025-01-16 | End: 2025-01-16

## 2025-01-16 RX ORDER — TRAMADOL HYDROCHLORIDE 50 MG/1
50 TABLET ORAL ONCE
Refills: 0 | Status: DISCONTINUED | OUTPATIENT
Start: 2025-01-16 | End: 2025-01-16

## 2025-01-16 RX ADMIN — Medication 500 MILLIGRAM(S): at 21:23

## 2025-01-16 RX ADMIN — GABAPENTIN 300 MILLIGRAM(S): 300 CAPSULE ORAL at 05:50

## 2025-01-16 RX ADMIN — Medication 200 MILLIGRAM(S): at 17:57

## 2025-01-16 RX ADMIN — TRAMADOL HYDROCHLORIDE 50 MILLIGRAM(S): 50 TABLET ORAL at 14:48

## 2025-01-16 RX ADMIN — TRAMADOL HYDROCHLORIDE 50 MILLIGRAM(S): 50 TABLET ORAL at 23:02

## 2025-01-16 RX ADMIN — ACETAMINOPHEN 975 MILLIGRAM(S): 80 SOLUTION/ DROPS ORAL at 13:09

## 2025-01-16 RX ADMIN — ACETAMINOPHEN 975 MILLIGRAM(S): 80 SOLUTION/ DROPS ORAL at 13:05

## 2025-01-16 RX ADMIN — ACETAMINOPHEN 975 MILLIGRAM(S): 80 SOLUTION/ DROPS ORAL at 21:22

## 2025-01-16 RX ADMIN — ENOXAPARIN SODIUM 40 MILLIGRAM(S): 60 INJECTION INTRAVENOUS; SUBCUTANEOUS at 05:50

## 2025-01-16 RX ADMIN — Medication 200 MILLIGRAM(S): at 18:08

## 2025-01-16 RX ADMIN — PANTOPRAZOLE 40 MILLIGRAM(S): 40 TABLET, DELAYED RELEASE ORAL at 05:50

## 2025-01-16 RX ADMIN — ACETAMINOPHEN 975 MILLIGRAM(S): 80 SOLUTION/ DROPS ORAL at 05:50

## 2025-01-16 RX ADMIN — ATORVASTATIN CALCIUM 40 MILLIGRAM(S): 40 TABLET, FILM COATED ORAL at 21:23

## 2025-01-16 RX ADMIN — MAGNESIUM SULFATE 25 GRAM(S): 500 INJECTION, SOLUTION INTRAMUSCULAR; INTRAVENOUS at 08:54

## 2025-01-16 RX ADMIN — GABAPENTIN 300 MILLIGRAM(S): 300 CAPSULE ORAL at 21:23

## 2025-01-16 RX ADMIN — TRAMADOL HYDROCHLORIDE 50 MILLIGRAM(S): 50 TABLET ORAL at 15:48

## 2025-01-16 RX ADMIN — SENNOSIDES 2 TABLET(S): 8.6 TABLET, FILM COATED ORAL at 21:22

## 2025-01-16 RX ADMIN — ACETAMINOPHEN 975 MILLIGRAM(S): 80 SOLUTION/ DROPS ORAL at 21:30

## 2025-01-16 RX ADMIN — CHLORHEXIDINE GLUCONATE 1 APPLICATION(S): 1.2 RINSE ORAL at 13:10

## 2025-01-16 RX ADMIN — TRAMADOL HYDROCHLORIDE 50 MILLIGRAM(S): 50 TABLET ORAL at 16:46

## 2025-01-16 RX ADMIN — Medication 500 MILLIGRAM(S): at 05:50

## 2025-01-16 RX ADMIN — Medication 200 MILLIGRAM(S): at 05:50

## 2025-01-16 RX ADMIN — TRAMADOL HYDROCHLORIDE 50 MILLIGRAM(S): 50 TABLET ORAL at 17:49

## 2025-01-16 RX ADMIN — TRAMADOL HYDROCHLORIDE 50 MILLIGRAM(S): 50 TABLET ORAL at 05:50

## 2025-01-16 RX ADMIN — TRAMADOL HYDROCHLORIDE 50 MILLIGRAM(S): 50 TABLET ORAL at 06:33

## 2025-01-16 RX ADMIN — Medication 500 MILLIGRAM(S): at 13:06

## 2025-01-16 RX ADMIN — GABAPENTIN 300 MILLIGRAM(S): 300 CAPSULE ORAL at 13:06

## 2025-01-16 NOTE — PROGRESS NOTE ADULT - SUBJECTIVE AND OBJECTIVE BOX
YOSELIN [id number]    74yMale  Spinal stenosis of lumbar region with neurogenic claudication    Family history of acute lymphoid leukemia (Father)    Handoff    MEWS Score    CAD (coronary artery disease)    Congenital extroversion of urinary bladder    Ureterostomy status    Encephalopathy in sepsis    Constipation    CAD (coronary artery disease)    H/O neuropathy    Hyperlipidemia    DVT, lower extremity    HTN (hypertension)    Lumbar stenosis with neurogenic claudication    Lumbosacral stenosis with neurogenic claudication    Revision, laminectomy, lumbar, 4 levels    Laminectomy, spine, lumbar, 4 or more levels    Lumbar stenosis with neurogenic claudication    Spinal stenosis of lumbar region    Spinal stenosis, lumbar region with neurogenic claudication    Need for prophylactic measure    History of urostomy    HTN (hypertension)    CAD (coronary artery disease)    DVT, lower extremity    H/O neuropathy    Laminectomy of lumbar spine at 3 or more levels for stenosis    History of urinary diversion procedure    H/O laminectomy    History of bladder surgery    Stented coronary artery    SPINAL STENOSIS, LUMBAR REGION    [id number]      POST OPERATIVE DAY #: 3  STATUS POST:                        SUBJECTIVE: Patient seen and examined at the bedside. Patient reports pain is controlled with perscribed medication. Patient is participating with PT. Cardiology and EP following. Patient denies acute motor or sensory changes. Denies CP, SOB, dizziness, HA. No additional orthopedic complaints at this time.    OBJECTIVE:   Vital Signs Last 24 Hrs  T(C): 36.6 (16 Jan 2025 07:29), Max: 36.8 (15 Chava 2025 10:00)  T(F): 97.8 (16 Jan 2025 07:29), Max: 98.2 (15 Chava 2025 10:00)  HR: 74 (16 Jan 2025 08:00) (61 - 107)  BP: 106/70 (16 Jan 2025 08:00) (94/62 - 130/73)  BP(mean): 82 (16 Jan 2025 08:00) (71 - 102)  RR: 16 (16 Jan 2025 08:00) (16 - 18)  SpO2: 95% (16 Jan 2025 08:00) (94% - 99%)    Parameters below as of 16 Jan 2025 08:00  Patient On (Oxygen Delivery Method): room air    Constitutional:Alert, awake    Spine:          Dressing: clean/dry/intact         Sensation:          Lower extremity             sp         dp         saph       asher         tibial                                                R          +           +             +           +          +                                               L           +           +             +           +          +                     Motor exam:          Lower extremity          HF(l2)   KE(l3)    TA(l4)   EHL(l5)  GS(s1)                                                 R        5/5        5/5        5/5       5/5         5/5                                               L         5/5        5/5       5/5       5/5          5/5                                                 B/L LE: warm well perfused; capillary refill <3 seconds. BCR. Calves soft NT           LABS:                        10.3   7.68  )-----------( 214      ( 16 Jan 2025 06:13 )             32.1     A/P :  74y Male S/P laminectomy L2-5 POD#3  -    Pain control  -    DVT ppx-lovenox  -    ADAT  -    Check AM labs     -    Resume home meds  -    Physical Therapy  -    Weight bearing status: WBAT

## 2025-01-16 NOTE — PROGRESS NOTE ADULT - SUBJECTIVE AND OBJECTIVE BOX
YOSELIN DORADO    [id number]    74y      Male    Patient is a 74y old  Male who presents with a chief complaint of Spinal stenosis of lumbar region with neurogenic claudication     (15 Chava 2025 10:34)      INTERVAL HPI/OVERNIGHT EVENTS:    Patient's pain is well controlled, denies fever, chills, chest pain, sob, patient had episode of palpitation yesterday while working with PT, cardiac monitored showed narrow complex tachycardia, it lasted for 2 minutes and resolved by its own.    Vital Signs Last 24 Hrs  T(C): 36.6 (16 Jan 2025 07:29), Max: 36.7 (15 Chava 2025 12:00)  T(F): 97.8 (16 Jan 2025 07:29), Max: 98.1 (15 Chava 2025 12:00)  HR: 68 (16 Jan 2025 10:00) (61 - 107)  BP: 101/70 (16 Jan 2025 10:00) (94/62 - 130/73)  BP(mean): 80 (16 Jan 2025 10:00) (71 - 102)  RR: 18 (16 Jan 2025 10:00) (16 - 18)  SpO2: 94% (16 Jan 2025 10:00) (94% - 99%)    Parameters below as of 16 Jan 2025 10:00  Patient On (Oxygen Delivery Method): room air        PHYSICAL EXAM:    GENERAL: Elderly male looking comfortable   HEENT: PERRL, +EOMI  NECK: soft, Supple, No JVD   CHEST/LUNG: Clear to auscultate bilaterally; No wheezing  HEART: S1S2+, Regular rate and rhythm; No murmurs  ABDOMEN: Soft, Nontender, Nondistended; Bowel sounds present, has urostomy tube   Eext: No edema  SKIN: No rashes or lesions  NEURO: AAOX3  PSYCH: normal mood  lower back: clean dressings on, has drain in place     LABS:                        10.3   7.68  )-----------( 214      ( 16 Jan 2025 06:13 )             32.1     01-16    138  |  106  |  14.7  ----------------------------<  97  4.1   |  25.0  |  0.62    Ca    8.6      16 Jan 2025 06:13  Mg     1.6     01-16    TPro  5.6[L]  /  Alb  3.0[L]  /  TBili  0.3[L]  /  DBili  x   /  AST  15  /  ALT  11  /  AlkPhos  56  01-16    PT/INR - ( 14 Jan 2025 10:41 )   PT: 12.5 sec;   INR: 1.11 ratio         PTT - ( 14 Jan 2025 10:41 )  PTT:32.5 sec      I&O's Summary    15 Chava 2025 07:01  -  16 Jan 2025 07:00  --------------------------------------------------------  IN: 300 mL / OUT: 3342 mL / NET: -3042 mL    16 Jan 2025 07:01  -  16 Jan 2025 10:23  --------------------------------------------------------  IN: 50 mL / OUT: 0 mL / NET: 50 mL        MEDICATIONS  (STANDING):  acetaminophen     Tablet .. 975 milliGRAM(s) Oral every 8 hours  atorvastatin 40 milliGRAM(s) Oral at bedtime  celecoxib 200 milliGRAM(s) Oral every 12 hours  chlorhexidine 2% Cloths 1 Application(s) Topical daily  enoxaparin Injectable 40 milliGRAM(s) SubCutaneous every 24 hours  gabapentin 300 milliGRAM(s) Oral every 8 hours  lactated ringers. 1000 milliLiter(s) (80 mL/Hr) IV Continuous <Continuous>  methocarbamol 500 milliGRAM(s) Oral every 8 hours  metoprolol succinate ER 25 milliGRAM(s) Oral daily  pantoprazole    Tablet 40 milliGRAM(s) Oral before breakfast  senna 2 Tablet(s) Oral at bedtime  sodium chloride 0.9% Bolus 1000 milliLiter(s) IV Bolus once    MEDICATIONS  (PRN):  aluminum hydroxide/magnesium hydroxide/simethicone Suspension 30 milliLiter(s) Oral every 12 hours PRN Indigestion  magnesium hydroxide Suspension 30 milliLiter(s) Oral every 12 hours PRN Constipation  naloxone Injectable 0.1 milliGRAM(s) IV Push every 3 minutes PRN For ANY of the following changes in patient status:  A. RR LESS THAN 10 breaths per minute, B. Oxygen saturation LESS THAN 90%, C. Sedation score of 6  ondansetron Injectable 4 milliGRAM(s) IV Push every 6 hours PRN Nausea  ondansetron Injectable 4 milliGRAM(s) IV Push every 6 hours PRN Nausea and/or Vomiting  ondansetron Injectable 4 milliGRAM(s) IV Push every 6 hours PRN Nausea and/or Vomiting  traMADol 25 milliGRAM(s) Oral every 4 hours PRN Mild Pain (1 - 3)  traMADol 50 milliGRAM(s) Oral every 4 hours PRN Moderate Pain (4 - 6)  traMADol 100 milliGRAM(s) Oral every 6 hours PRN Severe Pain (7 - 10)

## 2025-01-16 NOTE — PROGRESS NOTE ADULT - SUBJECTIVE AND OBJECTIVE BOX
Subjective: Pt with an episode of SVT @ 150-160BPM for approximately 2 minutes yesterday afternoon while ambulating with PT. Pt reported palpitations during the event. Metoprolol was resumed and no further events noted on telemetry. Pt currently w/o complaints at time of assessment.     TELE: Sinus rhythm with intact conduction. Episode of SVT @ 150-160BPM for approximately 2 minutes yesterday afternoon.    MEDICATIONS  (STANDING):  acetaminophen     Tablet .. 975 milliGRAM(s) Oral every 8 hours  atorvastatin 40 milliGRAM(s) Oral at bedtime  celecoxib 200 milliGRAM(s) Oral every 12 hours  chlorhexidine 2% Cloths 1 Application(s) Topical daily  enoxaparin Injectable 40 milliGRAM(s) SubCutaneous every 24 hours  gabapentin 300 milliGRAM(s) Oral every 8 hours  lactated ringers. 1000 milliLiter(s) (80 mL/Hr) IV Continuous <Continuous>  methocarbamol 500 milliGRAM(s) Oral every 8 hours  metoprolol succinate ER 25 milliGRAM(s) Oral daily  pantoprazole    Tablet 40 milliGRAM(s) Oral before breakfast  senna 2 Tablet(s) Oral at bedtime  sodium chloride 0.9% Bolus 1000 milliLiter(s) IV Bolus once    MEDICATIONS  (PRN):  aluminum hydroxide/magnesium hydroxide/simethicone Suspension 30 milliLiter(s) Oral every 12 hours PRN Indigestion  magnesium hydroxide Suspension 30 milliLiter(s) Oral every 12 hours PRN Constipation  naloxone Injectable 0.1 milliGRAM(s) IV Push every 3 minutes PRN For ANY of the following changes in patient status:  A. RR LESS THAN 10 breaths per minute, B. Oxygen saturation LESS THAN 90%, C. Sedation score of 6  ondansetron Injectable 4 milliGRAM(s) IV Push every 6 hours PRN Nausea  ondansetron Injectable 4 milliGRAM(s) IV Push every 6 hours PRN Nausea and/or Vomiting  ondansetron Injectable 4 milliGRAM(s) IV Push every 6 hours PRN Nausea and/or Vomiting  traMADol 25 milliGRAM(s) Oral every 4 hours PRN Mild Pain (1 - 3)  traMADol 50 milliGRAM(s) Oral every 4 hours PRN Moderate Pain (4 - 6)  traMADol 100 milliGRAM(s) Oral every 6 hours PRN Severe Pain (7 - 10)      Allergies    Sulfur Precipitated (Rash)    Intolerances        Vital Signs Last 24 Hrs  T(C): 36.6 (16 Jan 2025 07:29), Max: 36.7 (15 Chava 2025 12:00)  T(F): 97.8 (16 Jan 2025 07:29), Max: 98.1 (15 Chava 2025 12:00)  HR: 68 (16 Jan 2025 10:00) (61 - 107)  BP: 101/70 (16 Jan 2025 10:00) (94/62 - 130/73)  BP(mean): 80 (16 Jan 2025 10:00) (71 - 102)  RR: 18 (16 Jan 2025 10:00) (16 - 18)  SpO2: 94% (16 Jan 2025 10:00) (94% - 99%)    Parameters below as of 16 Jan 2025 10:00  Patient On (Oxygen Delivery Method): room air        Physical Exam:  Constitutional: NAD  Chest wall: normal in appearance, nontender to palpation  Resp: effort normal, breath sounds clear to auscultation bilaterally  Cardiac: Heart regular rate and rhythm.  Neuro: Alert and oriented x 3      LABS:                        10.3   7.68  )-----------( 214      ( 16 Jan 2025 06:13 )             32.1     01-16    138  |  106  |  14.7  ----------------------------<  97  4.1   |  25.0  |  0.62    Ca    8.6      16 Jan 2025 06:13  Mg     1.6     01-16    TPro  5.6[L]  /  Alb  3.0[L]  /  TBili  0.3[L]  /  DBili  x   /  AST  15  /  ALT  11  /  AlkPhos  56  01-16      Urinalysis Basic - ( 16 Jan 2025 06:13 )    Color: x / Appearance: x / SG: x / pH: x  Gluc: 97 mg/dL / Ketone: x  / Bili: x / Urobili: x   Blood: x / Protein: x / Nitrite: x   Leuk Esterase: x / RBC: x / WBC x   Sq Epi: x / Non Sq Epi: x / Bacteria: x        RADIOLOGY & ADDITIONAL TESTS:      < from: TTE W or WO Ultrasound Enhancing Agent (01.14.25 @ 15:33) >  1. Left ventricular cavity is normal in size. Left ventricular systolic function is normal with an ejection fraction of 65 %by Gonzalez's method of disks with an ejection fraction visually estimated at 60 to 65 %.   2. There is no evidence of a left ventricular thrombus.   3. Normal right ventricular cavity size and normal right ventricular systolic function.   4. Normal left and right atrial size.   5. No significant valvular disease.   6. Estimated pulmonary artery systolic pressure is 21 mmHg, consistent with normal pulmonary artery pressure.   7. No pericardial effusion seen.   8. No prior echocardiogram is available for comparison.    < end of copied text >        Assessment:    Pt is a 74 year old male with CAD s/p AW MI s/p TNK + PCI/stents to LAD (4/11/2004), GERD, HTN, HLD, extrophy of bladder, s/p multiple urological surgeries, s/p urostomy placed 2015, melanoma s/p removal x2 who is POD#3 s/p L2-L5 laminectomy. EP service was consulted regarding a transient pause of 7.92 seconds today. Pause appeared vagally mediated in nature and AVN blockers well held with no further significant pauses.      Yesterday afternoon pt noted with an episode of SVT @ 150-160BPM for approximately 2 minutes . Pt reported palpitations during the event. Metoprolol was resumed and no further events noted on telemetry. Pt currently w/o complaints at time of assessment.    Recommendations  - Pt tolerating Metoprolol with no recurrent pauses  - No current indication for urgent pacemaker at this time. Would recommend follow up with Dr. Velasquez upon discharge to potentially discuss MCOT or long term monitoring with ILR.   - Continue with telemetry monitoring throughout admission  - Obtain orthostatic VS  - No further inpatient EP intervention required at this time. Call with any questions / concerns     Will discuss with Dr. Gracia, further recommendations to follow

## 2025-01-17 ENCOUNTER — TRANSCRIPTION ENCOUNTER (OUTPATIENT)
Age: 75
End: 2025-01-17

## 2025-01-17 VITALS
SYSTOLIC BLOOD PRESSURE: 125 MMHG | OXYGEN SATURATION: 96 % | TEMPERATURE: 98 F | HEART RATE: 80 BPM | DIASTOLIC BLOOD PRESSURE: 78 MMHG | RESPIRATION RATE: 16 BRPM

## 2025-01-17 PROCEDURE — 76000 FLUOROSCOPY <1 HR PHYS/QHP: CPT

## 2025-01-17 PROCEDURE — 97110 THERAPEUTIC EXERCISES: CPT

## 2025-01-17 PROCEDURE — C9399: CPT

## 2025-01-17 PROCEDURE — 87641 MR-STAPH DNA AMP PROBE: CPT

## 2025-01-17 PROCEDURE — 87640 STAPH A DNA AMP PROBE: CPT

## 2025-01-17 PROCEDURE — 82962 GLUCOSE BLOOD TEST: CPT

## 2025-01-17 PROCEDURE — 82550 ASSAY OF CK (CPK): CPT

## 2025-01-17 PROCEDURE — 97116 GAIT TRAINING THERAPY: CPT

## 2025-01-17 PROCEDURE — 93005 ELECTROCARDIOGRAM TRACING: CPT

## 2025-01-17 PROCEDURE — 97163 PT EVAL HIGH COMPLEX 45 MIN: CPT

## 2025-01-17 PROCEDURE — C1889: CPT

## 2025-01-17 PROCEDURE — 84443 ASSAY THYROID STIM HORMONE: CPT

## 2025-01-17 PROCEDURE — 85025 COMPLETE CBC W/AUTO DIFF WBC: CPT

## 2025-01-17 PROCEDURE — 36415 COLL VENOUS BLD VENIPUNCTURE: CPT

## 2025-01-17 PROCEDURE — 85610 PROTHROMBIN TIME: CPT

## 2025-01-17 PROCEDURE — 83735 ASSAY OF MAGNESIUM: CPT

## 2025-01-17 PROCEDURE — 84484 ASSAY OF TROPONIN QUANT: CPT

## 2025-01-17 PROCEDURE — 80053 COMPREHEN METABOLIC PANEL: CPT

## 2025-01-17 PROCEDURE — 85730 THROMBOPLASTIN TIME PARTIAL: CPT

## 2025-01-17 PROCEDURE — 80048 BASIC METABOLIC PNL TOTAL CA: CPT

## 2025-01-17 PROCEDURE — 85027 COMPLETE CBC AUTOMATED: CPT

## 2025-01-17 PROCEDURE — C8929: CPT

## 2025-01-17 PROCEDURE — 99232 SBSQ HOSP IP/OBS MODERATE 35: CPT

## 2025-01-17 PROCEDURE — 99231 SBSQ HOSP IP/OBS SF/LOW 25: CPT

## 2025-01-17 PROCEDURE — 86618 LYME DISEASE ANTIBODY: CPT

## 2025-01-17 PROCEDURE — 82553 CREATINE MB FRACTION: CPT

## 2025-01-17 RX ORDER — POLYETHYLENE GLYCOL 3350 17 G/DOSE
17 POWDER (GRAM) ORAL DAILY
Refills: 0 | Status: DISCONTINUED | OUTPATIENT
Start: 2025-01-17 | End: 2025-01-17

## 2025-01-17 RX ORDER — OXYCODONE HCL 15 MG
1 TABLET ORAL
Qty: 20 | Refills: 0
Start: 2025-01-17 | End: 2025-01-21

## 2025-01-17 RX ORDER — ENOXAPARIN SODIUM 60 MG/.6ML
40 INJECTION INTRAVENOUS; SUBCUTANEOUS
Qty: 1 | Refills: 0
Start: 2025-01-17 | End: 2025-02-09

## 2025-01-17 RX ORDER — ACETAMINOPHEN 80 MG/.8ML
3 SOLUTION/ DROPS ORAL
Qty: 0 | Refills: 0 | DISCHARGE
Start: 2025-01-17

## 2025-01-17 RX ORDER — HYDROCODONE BITARTRATE 10 MG/1
1 CAPSULE, EXTENDED RELEASE ORAL
Refills: 0 | DISCHARGE

## 2025-01-17 RX ORDER — BISACODYL 5 MG
10 TABLET, DELAYED RELEASE (ENTERIC COATED) ORAL DAILY
Refills: 0 | Status: DISCONTINUED | OUTPATIENT
Start: 2025-01-17 | End: 2025-01-17

## 2025-01-17 RX ORDER — METHOCARBAMOL 500 MG
2 TABLET ORAL
Qty: 0 | Refills: 0 | DISCHARGE

## 2025-01-17 RX ORDER — TRAMADOL HYDROCHLORIDE 50 MG/1
1 TABLET ORAL
Qty: 0 | Refills: 0 | DISCHARGE

## 2025-01-17 RX ORDER — METHOCARBAMOL 500 MG
1 TABLET ORAL
Qty: 15 | Refills: 0
Start: 2025-01-17 | End: 2025-01-21

## 2025-01-17 RX ORDER — NALOXONE HCL 0.4 MG/ML
4 VIAL (ML) INJECTION
Qty: 1 | Refills: 0
Start: 2025-01-17

## 2025-01-17 RX ADMIN — TRAMADOL HYDROCHLORIDE 50 MILLIGRAM(S): 50 TABLET ORAL at 00:06

## 2025-01-17 RX ADMIN — GABAPENTIN 300 MILLIGRAM(S): 300 CAPSULE ORAL at 13:48

## 2025-01-17 RX ADMIN — Medication 10 MILLIGRAM(S): at 12:38

## 2025-01-17 RX ADMIN — ACETAMINOPHEN 975 MILLIGRAM(S): 80 SOLUTION/ DROPS ORAL at 13:48

## 2025-01-17 RX ADMIN — ACETAMINOPHEN 975 MILLIGRAM(S): 80 SOLUTION/ DROPS ORAL at 06:13

## 2025-01-17 RX ADMIN — ACETAMINOPHEN 975 MILLIGRAM(S): 80 SOLUTION/ DROPS ORAL at 06:17

## 2025-01-17 RX ADMIN — PANTOPRAZOLE 40 MILLIGRAM(S): 40 TABLET, DELAYED RELEASE ORAL at 06:14

## 2025-01-17 RX ADMIN — GABAPENTIN 300 MILLIGRAM(S): 300 CAPSULE ORAL at 06:13

## 2025-01-17 RX ADMIN — ENOXAPARIN SODIUM 40 MILLIGRAM(S): 60 INJECTION INTRAVENOUS; SUBCUTANEOUS at 06:13

## 2025-01-17 RX ADMIN — Medication 200 MILLIGRAM(S): at 06:17

## 2025-01-17 RX ADMIN — TRAMADOL HYDROCHLORIDE 50 MILLIGRAM(S): 50 TABLET ORAL at 04:14

## 2025-01-17 RX ADMIN — Medication 200 MILLIGRAM(S): at 06:13

## 2025-01-17 RX ADMIN — TRAMADOL HYDROCHLORIDE 50 MILLIGRAM(S): 50 TABLET ORAL at 05:15

## 2025-01-17 RX ADMIN — Medication 500 MILLIGRAM(S): at 13:49

## 2025-01-17 RX ADMIN — CHLORHEXIDINE GLUCONATE 1 APPLICATION(S): 1.2 RINSE ORAL at 11:51

## 2025-01-17 RX ADMIN — Medication 17 GRAM(S): at 12:38

## 2025-01-17 RX ADMIN — Medication 500 MILLIGRAM(S): at 06:14

## 2025-01-17 RX ADMIN — Medication 25 MILLIGRAM(S): at 06:14

## 2025-01-17 RX ADMIN — TRAMADOL HYDROCHLORIDE 50 MILLIGRAM(S): 50 TABLET ORAL at 15:16

## 2025-01-17 NOTE — PROGRESS NOTE ADULT - SUBJECTIVE AND OBJECTIVE BOX
YOSELIN LAURENCHUCHOLA    [id number]    74y      Male    Patient is a 74y old  Male who presents with a chief complaint of As per H&P (16 Jan 2025 10:48)      INTERVAL HPI/OVERNIGHT EVENTS:    patient is doing ok, his pain is well controlled, no over night alarms on cardiac monitor, denies fever, chills, chest pain, sob     Vital Signs Last 24 Hrs  T(C): 36.4 (17 Jan 2025 07:35), Max: 36.7 (17 Jan 2025 00:03)  T(F): 97.5 (17 Jan 2025 07:35), Max: 98 (17 Jan 2025 00:03)  HR: 69 (17 Jan 2025 10:00) (60 - 83)  BP: 120/67 (17 Jan 2025 10:00) (105/64 - 145/64)  BP(mean): 83 (17 Jan 2025 10:00) (77 - 96)  RR: 18 (17 Jan 2025 10:00) (15 - 18)  SpO2: 95% (17 Jan 2025 10:00) (93% - 99%)    Parameters below as of 17 Jan 2025 10:00  Patient On (Oxygen Delivery Method): room air        PHYSICAL EXAM:      GENERAL: Elderly male looking comfortable   HEENT: PERRL, +EOMI  NECK: soft, Supple, No JVD   CHEST/LUNG: Clear to auscultate bilaterally; No wheezing  HEART: S1S2+, Regular rate and rhythm; No murmurs  ABDOMEN: Soft, Nontender, Nondistended; Bowel sounds present, has urostomy tube   Eext: No edema  SKIN: No rashes or lesions  NEURO: AAOX3  PSYCH: normal mood  lower back: clean dressings on         LABS:                        10.3   7.68  )-----------( 214      ( 16 Jan 2025 06:13 )             32.1     01-16    138  |  106  |  14.7  ----------------------------<  97  4.1   |  25.0  |  0.62    Ca    8.6      16 Jan 2025 06:13  Mg     1.6     01-16    TPro  5.6[L]  /  Alb  3.0[L]  /  TBili  0.3[L]  /  DBili  x   /  AST  15  /  ALT  11  /  AlkPhos  56  01-16          I&O's Summary    16 Jan 2025 07:01  -  17 Jan 2025 07:00  --------------------------------------------------------  IN: 420 mL / OUT: 2000 mL / NET: -1580 mL    17 Jan 2025 07:01  -  17 Jan 2025 10:50  --------------------------------------------------------  IN: 0 mL / OUT: 450 mL / NET: -450 mL        MEDICATIONS  (STANDING):  acetaminophen     Tablet .. 975 milliGRAM(s) Oral every 8 hours  atorvastatin 40 milliGRAM(s) Oral at bedtime  celecoxib 200 milliGRAM(s) Oral every 12 hours  chlorhexidine 2% Cloths 1 Application(s) Topical daily  enoxaparin Injectable 40 milliGRAM(s) SubCutaneous every 24 hours  gabapentin 300 milliGRAM(s) Oral every 8 hours  lactated ringers. 1000 milliLiter(s) (80 mL/Hr) IV Continuous <Continuous>  methocarbamol 500 milliGRAM(s) Oral every 8 hours  metoprolol succinate ER 25 milliGRAM(s) Oral daily  pantoprazole    Tablet 40 milliGRAM(s) Oral before breakfast  polyethylene glycol 3350 17 Gram(s) Oral daily  senna 2 Tablet(s) Oral at bedtime  sodium chloride 0.9% Bolus 1000 milliLiter(s) IV Bolus once    MEDICATIONS  (PRN):  aluminum hydroxide/magnesium hydroxide/simethicone Suspension 30 milliLiter(s) Oral every 12 hours PRN Indigestion  bisacodyl Suppository 10 milliGRAM(s) Rectal daily PRN Constipation  magnesium hydroxide Suspension 30 milliLiter(s) Oral every 12 hours PRN Constipation  naloxone Injectable 0.1 milliGRAM(s) IV Push every 3 minutes PRN For ANY of the following changes in patient status:  A. RR LESS THAN 10 breaths per minute, B. Oxygen saturation LESS THAN 90%, C. Sedation score of 6  ondansetron Injectable 4 milliGRAM(s) IV Push every 6 hours PRN Nausea  ondansetron Injectable 4 milliGRAM(s) IV Push every 6 hours PRN Nausea and/or Vomiting  ondansetron Injectable 4 milliGRAM(s) IV Push every 6 hours PRN Nausea and/or Vomiting  traMADol 25 milliGRAM(s) Oral every 4 hours PRN Mild Pain (1 - 3)  traMADol 50 milliGRAM(s) Oral every 4 hours PRN Moderate Pain (4 - 6)  traMADol 100 milliGRAM(s) Oral every 6 hours PRN Severe Pain (7 - 10)

## 2025-01-17 NOTE — PROGRESS NOTE ADULT - ASSESSMENT
73 y/o male with Hx of CAD 2 stents, extrophy of bladder, s/p 30 urological surgeries, encephalopathy, s/p urostomy placed 2015, melanoma s/p removal x2 on neck and right cheek, presents with lower back pain severely worsening since Thanksgiving. He has had chronic lower back pain for several years. He was seeing pain management Dr. Marley for this who has provided him with multiple medications including narcotics, oxycodone, tramadol, hydrocodone, etc. He began to experience excruciating pain in the right lower extremity L5/S1 distribution. Patient has had an epidural injection and this had improved symptoms. He has Hx of posterior cervical laminectomy fusion for what sounds to be a cervical myelopathy. He has had residual numbness tingling in the bilateral lower extremities which has only moderately improved and was more intermittent in nature. He did have some difficulties with balance which has been chronic in nature long-term since previous cervical fusion. He found improvement from previous injection by Dr. Marley for approximately 24 hours and then pain subsequently returned. He has lower back pain but primarily right lower extremity radiculopathy, he was unable to weight-bear/stand for prolonged period of time secondary to worsening pain and unable to walk for prolonged periods. Patient came in here for elective L2-L5 laminectomy on 1/13/2025 with Dr. Guzman s/p procedure. On 1/14/25, patient had RRT secondary to vasovagal syncope with 7second and 4second pauses on telemetry.  EP consulted, no need for PPM at this time.      Plan:     Lower back pain s/p  L2-L5 laminectomy post op day 4:     - post op no complications  - VS stable  - abx per ortho    - opiate induced constipation regimen   - encouraging incentive spirometry   -c/w local wound care per ortho   -DVT prophylaxis and Pain meds as per Ortho team and pain management   -PT/OT and weight bearing per ortho   -Continue methocarbamol and gabapentin      Vasovagal syncope with pauses 7second and 4second  -Metoprolol held.  -Cardiology and EP consulted - no need for PPM at this time.  -TTE done - pending read.  -Check orthostatics.  -Monitor on telemetry.  -TSH WNL, F/u lyme negative  patient had episode of palpitation yesterday while working with PT, cardiac monitored showed narrow complex tachycardia, it lasted for 2 minutes and resolved by its own.  spoke EP they recommended resuming metoprolol, no further episode   no further Alarm on cardiac monitor     HTN: metoprolol succinate 25 mg once a day (at bedtime) now has been resumed   Hx of CAD: will continue with aspirin 81 mg once a day, when okay with Ortho.     Hx of HLD: will continue with atorvastatin 40 mg once a day (at bedtime).     Intermittent nocturnal hypoxia:   Recommend outpatient sleep study to r/o VALENTINE.  Discussed with patient.    Acute blood loss anemia due to procedure: Iron supplement    DVT prophylaxis: Lovenox as per Ortho.    Patient is stable from the medicine point of view for discharge pending PT and Ortho eval.

## 2025-01-17 NOTE — PROGRESS NOTE ADULT - SUBJECTIVE AND OBJECTIVE BOX
Patient seen today in bed after ambulation. Telemetry reviewed. No overnight complaints    TELE: SR with rates in the 60-70s.     MEDICATIONS  (STANDING):  acetaminophen     Tablet .. 975 milliGRAM(s) Oral every 8 hours  atorvastatin 40 milliGRAM(s) Oral at bedtime  celecoxib 200 milliGRAM(s) Oral every 12 hours  chlorhexidine 2% Cloths 1 Application(s) Topical daily  enoxaparin Injectable 40 milliGRAM(s) SubCutaneous every 24 hours  gabapentin 300 milliGRAM(s) Oral every 8 hours  lactated ringers. 1000 milliLiter(s) (80 mL/Hr) IV Continuous <Continuous>  methocarbamol 500 milliGRAM(s) Oral every 8 hours  metoprolol succinate ER 25 milliGRAM(s) Oral daily  pantoprazole    Tablet 40 milliGRAM(s) Oral before breakfast  polyethylene glycol 3350 17 Gram(s) Oral daily  senna 2 Tablet(s) Oral at bedtime  sodium chloride 0.9% Bolus 1000 milliLiter(s) IV Bolus once    MEDICATIONS  (PRN):  aluminum hydroxide/magnesium hydroxide/simethicone Suspension 30 milliLiter(s) Oral every 12 hours PRN Indigestion  bisacodyl Suppository 10 milliGRAM(s) Rectal daily PRN Constipation  magnesium hydroxide Suspension 30 milliLiter(s) Oral every 12 hours PRN Constipation  naloxone Injectable 0.1 milliGRAM(s) IV Push every 3 minutes PRN For ANY of the following changes in patient status:  A. RR LESS THAN 10 breaths per minute, B. Oxygen saturation LESS THAN 90%, C. 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 to age 14 years ol  Encephalopathy in sepsis  due to urinary disorder needed surgery diversion of ureter 2014  CAD (coronary artery disease)  H/O neuropathy  Hyperlipidemia  DVT, lower extremity  HTN (hypertension)  History of urinary diversion procedure  with urostomy  April 2014 with bag  H/O laminectomy  History of bladder surgery  Stented coronary artery    Vital Signs Last 24 Hrs  T(C): 36.4 (17 Jan 2025 07:35), Max: 36.7 (17 Jan 2025 00:03)  T(F): 97.5 (17 Jan 2025 07:35), Max: 98 (17 Jan 2025 00:03)  HR: 69 (17 Jan 2025 10:00) (60 - 83)  BP: 120/67 (17 Jan 2025 10:00) (105/64 - 145/64)  BP(mean): 83 (17 Jan 2025 10:00) (77 - 96)  RR: 18 (17 Jan 2025 10:00) (15 - 18)  SpO2: 95% (17 Jan 2025 10:00) (93% - 99%)    Parameters below as of 17 Jan 2025 10:00  Patient On (Oxygen Delivery Method): room air    Physical Exam:  Constitutional: NAD, AAOx3  Cardiovascular: +S1S2 RRR  Pulmonary: CTA b/l, unlabored  GI: soft NTND +BS  Extremities: no pedal edema, +distal pulses b/l  Neuro: non focal, PARDO x4    LABS:                        10.3   7.68  )-----------( 214      ( 16 Jan 2025 06:13 )             32.1     138  |  106  |  14.7  ----------------------------<  97  4.1   |  25.0  |  0.62  Ca    8.6      16 Jan 2025 06:13  Mg     1.6     01-16  TPro  5.6[L]  /  Alb  3.0[L]  /  TBili  0.3[L]  /  DBili  x   /  AST  15  /  ALT  11  /  AlkPhos  56  01-16    A/P  74 year old male with CAD s/p AW MI s/p TNK + PCI/stents to LAD (4/11/2004), GERD, HTN, HLD, extrophy of bladder, s/p multiple urological surgeries, s/p urostomy placed 2015, melanoma s/p removal x2 who is POD#3 s/p L2-L5 laminectomy. EP service was consulted regarding a transient pause of 7.92 seconds today. Pause appeared vagally mediated in nature and AVN blockers well held with no further significant pauses.      No further episodes of SVT recorded.   Tolerating resumption of Toprol XL 25mg daily    - Outpatient [facility]OT vs ILR with Dr. Kishor Velasquez  - No objection to discharge planning from EP perspective.   - Discussed this AM with Dr. Lau.

## 2025-01-17 NOTE — DISCHARGE NOTE NURSING/CASE MANAGEMENT/SOCIAL WORK - FINANCIAL ASSISTANCE
[facility] provides services at a reduced cost to those who are determined to be eligible through [facility]’s financial assistance program. Information regarding [facility]’s financial assistance program can be found by going to [email or web address] or by calling [phone].

## 2025-01-17 NOTE — PROGRESS NOTE ADULT - PROVIDER SPECIALTY LIST ADULT
Electrophysiology
Orthopedics
Electrophysiology
Electrophysiology
Hospitalist
Pain Medicine
Pain Medicine
Cardiology

## 2025-01-17 NOTE — DISCHARGE NOTE NURSING/CASE MANAGEMENT/SOCIAL WORK - NSDCPEFALRISK_GEN_ALL_CORE
For information on Fall & Injury Prevention, visit: [email or web address] OR  [email or web address] OR  https://www.cdc.gov/steadi/patient.html

## 2025-01-17 NOTE — DISCHARGE NOTE NURSING/CASE MANAGEMENT/SOCIAL WORK - CASE MANAGER'S NAME
Theresa Díaz RN CCC
My signature below certifies that the above stated patient is homebound and upon completion of the Face-To-Face encounter, has the need for intermittent skilled nursing, physical therapy and/or speech or occupational therapy services in their home for their current diagnosis as outlined in their initial plan of care. These services will continue to be monitored by myself or another physician.

## 2025-01-17 NOTE — DISCHARGE NOTE NURSING/CASE MANAGEMENT/SOCIAL WORK - PATIENT PORTAL LINK FT
You can access the FollowMyHealth Patient Portal offered by [facility] by registering at the following website: [email or web address]. By joining [email or web address] [email or web address] portal, you will also be able to view your health information using other applications (apps) compatible with our system.

## 2025-01-17 NOTE — PROGRESS NOTE ADULT - SUBJECTIVE AND OBJECTIVE BOX
YOSELIN DORADO  [id number]    POST OPERATIVE DAY #:  4  STATUS POST: L2-L5 laminectomy                      SUBJECTIVE: Patient seen and examined at the bedside. Patient reports pain is controlled with perscribed medication. Patient is participating with PT. Cardiology and EP following. Patient denies acute motor or sensory changes. Denies CP, SOB, dizziness, HA. No additional orthopedic complaints at this time.    OBJECTIVE:     Vital Signs Last 24 Hrs  T(C): 36.4 (17 Jan 2025 07:35), Max: 36.7 (17 Jan 2025 00:03)  T(F): 97.5 (17 Jan 2025 07:35), Max: 98 (17 Jan 2025 00:03)  HR: 69 (17 Jan 2025 10:00) (60 - 83)  BP: 120/67 (17 Jan 2025 10:00) (105/64 - 145/64)  BP(mean): 83 (17 Jan 2025 10:00) (77 - 96)  RR: 18 (17 Jan 2025 10:00) (15 - 18)  SpO2: 95% (17 Jan 2025 10:00) (93% - 99%)    Parameters below as of 17 Jan 2025 10:00  Patient On (Oxygen Delivery Method): room air      I&O's Summary    16 Jan 2025 07:01  -  17 Jan 2025 07:00  --------------------------------------------------------  IN: 420 mL / OUT: 2000 mL / NET: -1580 mL    17 Jan 2025 07:01  -  17 Jan 2025 10:58  --------------------------------------------------------  IN: 0 mL / OUT: 450 mL / NET: -450 mL        Constitutional:Alert, awake  Spine:          Dressing: + clean/dry/intact         Sensation:          Lower extremity                         sp         dp         saph       asher         tibial                                                R          +           +             +           +          +                                               L           +           +             +           +          +                     Motor exam:          Lower extremity                         HF(l2)   KE(l3)    TA(l4)   EHL(l5)  GS(s1)                                                 R        5/5        5/5        5/5       5/5         5/5                                               L         5/5        5/5       5/5       5/5          5/5                                                 B/L LE: warm well perfused; capillary refill <3 seconds. BCR. Calves soft NT           LABS:                        10.3   7.68  )-----------( 214      ( 16 Jan 2025 06:13 )             32.1       01-16    138  |  106  |  14.7  ----------------------------<  97  4.1   |  25.0  |  0.62    Ca    8.6      16 Jan 2025 06:13  Mg     1.6     01-16    TPro  5.6[L]  /  Alb  3.0[L]  /  TBili  0.3[L]  /  DBili  x   /  AST  15  /  ALT  11  /  AlkPhos  56  01-16        A/P :  74y Male S/P   L2-L5 laminectomy      POD# 4  -    Pain control  -    DVT ppx-lovenox  -    ADAT  -    Check AM labs     -    Resume home meds  -    Physical Therapy  -    Weight bearing status: WBAT

## 2025-01-17 NOTE — PROGRESS NOTE ADULT - NS ATTEND AMEND GEN_ALL_CORE FT
Agree with assessment and plan
Agree with above. Can continue metoprolol.
Patient is see and evaluated, chart reviewed in detail, agree with assessment and plan of the NP  patient's pain is well controlled, has no complains  S1 S2 audible  CTA b/l   Lower back is covered with clean dressing, no bleeding or soaking  Will continue with current plan of care  No further pauses on cardiac monitor  TTE pending  TSH is ok   Lyme Titer is pending
I agree with the plan above

## 2025-01-21 ENCOUNTER — APPOINTMENT (OUTPATIENT)
Dept: ORTHOPEDIC SURGERY | Facility: CLINIC | Age: 75
End: 2025-01-21
Payer: MEDICARE

## 2025-01-21 DIAGNOSIS — M48.062 SPINAL STENOSIS, LUMBAR REGION WITH NEUROGENIC CLAUDICATION: ICD-10-CM

## 2025-01-21 DIAGNOSIS — Z98.890 OTHER SPECIFIED POSTPROCEDURAL STATES: ICD-10-CM

## 2025-01-21 PROCEDURE — 99024 POSTOP FOLLOW-UP VISIT: CPT

## 2025-01-21 PROCEDURE — 72100 X-RAY EXAM L-S SPINE 2/3 VWS: CPT

## 2025-01-25 ENCOUNTER — NON-APPOINTMENT (OUTPATIENT)
Age: 75
End: 2025-01-25

## 2025-01-29 RX ORDER — TRAMADOL HYDROCHLORIDE 50 MG/1
50 TABLET, COATED ORAL
Qty: 42 | Refills: 0 | Status: ACTIVE | COMMUNITY
Start: 2025-01-21 | End: 1900-01-01

## 2025-02-06 ENCOUNTER — APPOINTMENT (OUTPATIENT)
Dept: ORTHOPEDIC SURGERY | Facility: CLINIC | Age: 75
End: 2025-02-06
Payer: MEDICARE

## 2025-02-06 DIAGNOSIS — M67.959 UNSPECIFIED DISORDER OF SYNOVIUM AND TENDON, UNSPECIFIED THIGH: ICD-10-CM

## 2025-02-06 DIAGNOSIS — Z98.890 OTHER SPECIFIED POSTPROCEDURAL STATES: ICD-10-CM

## 2025-02-06 PROCEDURE — 20552 NJX 1/MLT TRIGGER POINT 1/2: CPT | Mod: 78

## 2025-02-06 PROCEDURE — 99024 POSTOP FOLLOW-UP VISIT: CPT

## 2025-02-06 RX ORDER — METHOCARBAMOL 500 MG/1
500 TABLET, FILM COATED ORAL 3 TIMES DAILY
Qty: 30 | Refills: 0 | Status: ACTIVE | COMMUNITY
Start: 2025-02-06 | End: 1900-01-01

## 2025-02-14 ENCOUNTER — APPOINTMENT (OUTPATIENT)
Dept: ORTHOPEDIC SURGERY | Facility: CLINIC | Age: 75
End: 2025-02-14

## 2025-02-17 ENCOUNTER — NON-APPOINTMENT (OUTPATIENT)
Age: 75
End: 2025-02-17

## 2025-02-27 ENCOUNTER — APPOINTMENT (OUTPATIENT)
Dept: ORTHOPEDIC SURGERY | Facility: CLINIC | Age: 75
End: 2025-02-27
Payer: MEDICARE

## 2025-02-27 DIAGNOSIS — M41.80 OTHER FORMS OF SCOLIOSIS, SITE UNSPECIFIED: ICD-10-CM

## 2025-02-27 DIAGNOSIS — M67.959 UNSPECIFIED DISORDER OF SYNOVIUM AND TENDON, UNSPECIFIED THIGH: ICD-10-CM

## 2025-02-27 DIAGNOSIS — Z98.890 OTHER SPECIFIED POSTPROCEDURAL STATES: ICD-10-CM

## 2025-02-27 PROCEDURE — 99024 POSTOP FOLLOW-UP VISIT: CPT

## 2025-02-27 PROCEDURE — 72100 X-RAY EXAM L-S SPINE 2/3 VWS: CPT

## 2025-04-24 ENCOUNTER — APPOINTMENT (OUTPATIENT)
Dept: ORTHOPEDIC SURGERY | Facility: CLINIC | Age: 75
End: 2025-04-24
Payer: MEDICARE

## 2025-04-24 VITALS
DIASTOLIC BLOOD PRESSURE: 71 MMHG | WEIGHT: 164 LBS | HEIGHT: 66 IN | HEART RATE: 65 BPM | BODY MASS INDEX: 26.36 KG/M2 | SYSTOLIC BLOOD PRESSURE: 111 MMHG

## 2025-04-24 DIAGNOSIS — M60.9 MYOSITIS, UNSPECIFIED: ICD-10-CM

## 2025-04-24 DIAGNOSIS — Z98.890 OTHER SPECIFIED POSTPROCEDURAL STATES: ICD-10-CM

## 2025-04-24 PROCEDURE — 20552 NJX 1/MLT TRIGGER POINT 1/2: CPT

## 2025-04-24 PROCEDURE — 99213 OFFICE O/P EST LOW 20 MIN: CPT | Mod: 24

## 2025-04-30 RX ORDER — NAPROXEN 500 MG/1
500 TABLET ORAL TWICE DAILY
Qty: 60 | Refills: 0 | Status: ACTIVE | COMMUNITY
Start: 2025-04-30 | End: 1900-01-01

## 2025-05-14 ENCOUNTER — NON-APPOINTMENT (OUTPATIENT)
Age: 75
End: 2025-05-14

## 2025-05-20 ENCOUNTER — APPOINTMENT (OUTPATIENT)
Dept: UROLOGY | Facility: CLINIC | Age: 75
End: 2025-05-20
Payer: MEDICARE

## 2025-05-20 DIAGNOSIS — Z93.6 OTHER ARTIFICIAL OPENINGS OF URINARY TRACT STATUS: ICD-10-CM

## 2025-05-20 DIAGNOSIS — R31.0 GROSS HEMATURIA: ICD-10-CM

## 2025-05-20 DIAGNOSIS — Q64.10 EXSTROPHY OF URINARY BLADDER, UNSPECIFIED: ICD-10-CM

## 2025-05-20 PROCEDURE — 99213 OFFICE O/P EST LOW 20 MIN: CPT

## 2025-05-20 PROCEDURE — G2211 COMPLEX E/M VISIT ADD ON: CPT

## 2025-05-29 ENCOUNTER — RX RENEWAL (OUTPATIENT)
Age: 75
End: 2025-05-29

## 2025-06-30 ENCOUNTER — NON-APPOINTMENT (OUTPATIENT)
Age: 75
End: 2025-06-30

## 2025-07-02 ENCOUNTER — RX RENEWAL (OUTPATIENT)
Age: 75
End: 2025-07-02

## 2025-07-03 ENCOUNTER — OFFICE (OUTPATIENT)
Dept: URBAN - METROPOLITAN AREA CLINIC 113 | Facility: CLINIC | Age: 75
Setting detail: OPHTHALMOLOGY
End: 2025-07-03
Payer: COMMERCIAL

## 2025-07-03 DIAGNOSIS — H43.393: ICD-10-CM

## 2025-07-03 DIAGNOSIS — H25.13: ICD-10-CM

## 2025-07-03 PROCEDURE — 92250 FUNDUS PHOTOGRAPHY W/I&R: CPT | Performed by: OPHTHALMOLOGY

## 2025-07-03 PROCEDURE — 92014 COMPRE OPH EXAM EST PT 1/>: CPT | Performed by: OPHTHALMOLOGY

## 2025-07-03 ASSESSMENT — REFRACTION_CURRENTRX
OD_CYLINDER: -1.25
OS_VPRISM_DIRECTION: SV
OS_OVR_VA: 20/
OS_SPHERE: -1.75
OS_ADD: +2.50
OD_SPHERE: -2.00
OD_CYLINDER: -1.25
OS_CYLINDER: -1.25
OD_AXIS: 083
OS_OVR_VA: 20/
OD_VPRISM_DIRECTION: SV
OD_ADD: +2.00
OS_ADD: +2.50
OS_OVR_VA: 20/
OS_VPRISM_DIRECTION: PROGS
OS_AXIS: 101
OD_OVR_VA: 20/
OD_SPHERE: -2.00
OD_CYLINDER: -1.25
OD_ADD: +2.50
OS_SPHERE: -1.75
OD_VPRISM_DIRECTION: PROGS
OS_VPRISM_DIRECTION: PROGS
OS_CYLINDER: -1.00
OD_AXIS: 093
OS_AXIS: 094
OS_SPHERE: -1.75
OD_OVR_VA: 20/
OD_SPHERE: -2.00
OD_OVR_VA: 20/
OS_AXIS: 107
OD_VPRISM_DIRECTION: PROGS
OD_AXIS: 088
OS_CYLINDER: -1.00

## 2025-07-03 ASSESSMENT — CONFRONTATIONAL VISUAL FIELD TEST (CVF)
OS_FINDINGS: FULL
OD_FINDINGS: FULL

## 2025-07-03 ASSESSMENT — TONOMETRY
OD_IOP_MMHG: 18
OS_IOP_MMHG: 18

## 2025-07-03 ASSESSMENT — REFRACTION_MANIFEST
OD_ADD: +2.50
OS_SPHERE: -1.50
OS_CYLINDER: -1.25
OS_VA1: 20/20
OS_AXIS: 095
OD_CYLINDER: -1.50
OD_SPHERE: -1.75
OD_VA1: 20/20
OS_ADD: +2.50
OD_AXIS: 085

## 2025-07-03 ASSESSMENT — VISUAL ACUITY
OD_BCVA: 20/40-
OS_BCVA: 20/30

## 2025-07-03 ASSESSMENT — REFRACTION_AUTOREFRACTION
OD_CYLINDER: -2.25
OS_SPHERE: -0.75
OS_CYLINDER: -2.75
OS_AXIS: 089
OD_AXIS: 084
OD_SPHERE: -1.25

## 2025-07-03 ASSESSMENT — KERATOMETRY
OD_AXISANGLE_DEGREES: 015
OS_K2POWER_DIOPTERS: 44.00
OD_K1POWER_DIOPTERS: 43.00
OS_K1POWER_DIOPTERS: 43.00
OS_AXISANGLE_DEGREES: 169
OD_K2POWER_DIOPTERS: 44.50

## 2025-07-16 ENCOUNTER — APPOINTMENT (OUTPATIENT)
Dept: UROLOGY | Facility: CLINIC | Age: 75
End: 2025-07-16

## 2025-07-16 ENCOUNTER — APPOINTMENT (OUTPATIENT)
Dept: UROLOGY | Facility: CLINIC | Age: 75
End: 2025-07-16
Payer: MEDICARE

## 2025-07-16 VITALS
RESPIRATION RATE: 16 BRPM | DIASTOLIC BLOOD PRESSURE: 71 MMHG | SYSTOLIC BLOOD PRESSURE: 115 MMHG | HEART RATE: 82 BPM | TEMPERATURE: 97.2 F

## 2025-07-16 PROCEDURE — G2211 COMPLEX E/M VISIT ADD ON: CPT

## 2025-07-16 PROCEDURE — 99213 OFFICE O/P EST LOW 20 MIN: CPT

## 2025-07-17 ENCOUNTER — NON-APPOINTMENT (OUTPATIENT)
Age: 75
End: 2025-07-17

## 2025-07-17 LAB
ANION GAP SERPL CALC-SCNC: 16 MMOL/L
BUN SERPL-MCNC: 21 MG/DL
CALCIUM SERPL-MCNC: 9.6 MG/DL
CHLORIDE SERPL-SCNC: 107 MMOL/L
CO2 SERPL-SCNC: 21 MMOL/L
CREAT SERPL-MCNC: 0.92 MG/DL
EGFRCR SERPLBLD CKD-EPI 2021: 86 ML/MIN/1.73M2
GLUCOSE SERPL-MCNC: 129 MG/DL
HCT VFR BLD CALC: 38.1 %
HGB BLD-MCNC: 11.9 G/DL
MCHC RBC-ENTMCNC: 27.4 PG
MCHC RBC-ENTMCNC: 31.2 G/DL
MCV RBC AUTO: 87.8 FL
PLATELET # BLD AUTO: 369 K/UL
POTASSIUM SERPL-SCNC: 4.6 MMOL/L
RBC # BLD: 4.34 M/UL
RBC # FLD: 14.2 %
SODIUM SERPL-SCNC: 144 MMOL/L
WBC # FLD AUTO: 8.67 K/UL

## 2025-07-18 LAB — BACTERIA UR CULT: NORMAL

## 2025-07-19 ENCOUNTER — EMERGENCY (EMERGENCY)
Facility: HOSPITAL | Age: 75
LOS: 1 days | End: 2025-07-19
Attending: EMERGENCY MEDICINE | Admitting: EMERGENCY MEDICINE
Payer: MEDICARE

## 2025-07-19 VITALS
TEMPERATURE: 98 F | RESPIRATION RATE: 16 BRPM | WEIGHT: 160.06 LBS | HEIGHT: 66.5 IN | HEART RATE: 69 BPM | SYSTOLIC BLOOD PRESSURE: 114 MMHG | DIASTOLIC BLOOD PRESSURE: 73 MMHG | OXYGEN SATURATION: 96 %

## 2025-07-19 VITALS
RESPIRATION RATE: 18 BRPM | OXYGEN SATURATION: 100 % | TEMPERATURE: 98 F | SYSTOLIC BLOOD PRESSURE: 111 MMHG | HEART RATE: 76 BPM | DIASTOLIC BLOOD PRESSURE: 65 MMHG

## 2025-07-19 DIAGNOSIS — Z98.890 OTHER SPECIFIED POSTPROCEDURAL STATES: Chronic | ICD-10-CM

## 2025-07-19 DIAGNOSIS — Z98.89 OTHER SPECIFIED POSTPROCEDURAL STATES: Chronic | ICD-10-CM

## 2025-07-19 DIAGNOSIS — Z95.5 PRESENCE OF CORONARY ANGIOPLASTY IMPLANT AND GRAFT: Chronic | ICD-10-CM

## 2025-07-19 LAB
ALBUMIN SERPL ELPH-MCNC: 3.8 G/DL — SIGNIFICANT CHANGE UP (ref 3.3–5)
ALP SERPL-CCNC: 92 U/L — SIGNIFICANT CHANGE UP (ref 40–120)
ALT FLD-CCNC: 9 U/L — SIGNIFICANT CHANGE UP (ref 4–41)
ANION GAP SERPL CALC-SCNC: 14 MMOL/L — SIGNIFICANT CHANGE UP (ref 7–14)
APPEARANCE UR: ABNORMAL
APTT BLD: 34.5 SEC — SIGNIFICANT CHANGE UP (ref 26.1–36.8)
AST SERPL-CCNC: 11 U/L — SIGNIFICANT CHANGE UP (ref 4–40)
BACTERIA # UR AUTO: ABNORMAL /HPF
BASOPHILS # BLD AUTO: 0.07 K/UL — SIGNIFICANT CHANGE UP (ref 0–0.2)
BASOPHILS NFR BLD AUTO: 0.9 % — SIGNIFICANT CHANGE UP (ref 0–2)
BILIRUB SERPL-MCNC: 0.3 MG/DL — SIGNIFICANT CHANGE UP (ref 0.2–1.2)
BILIRUB UR-MCNC: ABNORMAL
BLOOD GAS VENOUS COMPREHENSIVE RESULT: SIGNIFICANT CHANGE UP
BUN SERPL-MCNC: 19 MG/DL — SIGNIFICANT CHANGE UP (ref 7–23)
CALCIUM SERPL-MCNC: 9.6 MG/DL — SIGNIFICANT CHANGE UP (ref 8.4–10.5)
CAST: 2 /LPF — SIGNIFICANT CHANGE UP (ref 0–4)
CHLORIDE SERPL-SCNC: 107 MMOL/L — SIGNIFICANT CHANGE UP (ref 98–107)
CO2 SERPL-SCNC: 20 MMOL/L — LOW (ref 22–31)
COLOR SPEC: ABNORMAL
CREAT SERPL-MCNC: 0.73 MG/DL — SIGNIFICANT CHANGE UP (ref 0.5–1.3)
DIFF PNL FLD: ABNORMAL
EGFR: 94 ML/MIN/1.73M2 — SIGNIFICANT CHANGE UP
EGFR: 94 ML/MIN/1.73M2 — SIGNIFICANT CHANGE UP
EOSINOPHIL # BLD AUTO: 0.16 K/UL — SIGNIFICANT CHANGE UP (ref 0–0.5)
EOSINOPHIL NFR BLD AUTO: 2 % — SIGNIFICANT CHANGE UP (ref 0–6)
FLUAV AG NPH QL: SIGNIFICANT CHANGE UP
FLUBV AG NPH QL: SIGNIFICANT CHANGE UP
GLUCOSE SERPL-MCNC: 93 MG/DL — SIGNIFICANT CHANGE UP (ref 70–99)
GLUCOSE UR QL: NEGATIVE MG/DL — SIGNIFICANT CHANGE UP
HCT VFR BLD CALC: 35.3 % — LOW (ref 39–50)
HGB BLD-MCNC: 11.8 G/DL — LOW (ref 13–17)
IMM GRANULOCYTES # BLD AUTO: 0.04 K/UL — SIGNIFICANT CHANGE UP (ref 0–0.07)
IMM GRANULOCYTES NFR BLD AUTO: 0.5 % — SIGNIFICANT CHANGE UP (ref 0–0.9)
INR BLD: 1.08 RATIO — SIGNIFICANT CHANGE UP (ref 0.85–1.16)
KETONES UR QL: NEGATIVE MG/DL — SIGNIFICANT CHANGE UP
LEUKOCYTE ESTERASE UR-ACNC: ABNORMAL
LYMPHOCYTES # BLD AUTO: 1.66 K/UL — SIGNIFICANT CHANGE UP (ref 1–3.3)
LYMPHOCYTES NFR BLD AUTO: 20.6 % — SIGNIFICANT CHANGE UP (ref 13–44)
MCHC RBC-ENTMCNC: 28.2 PG — SIGNIFICANT CHANGE UP (ref 27–34)
MCHC RBC-ENTMCNC: 33.4 G/DL — SIGNIFICANT CHANGE UP (ref 32–36)
MCV RBC AUTO: 84.4 FL — SIGNIFICANT CHANGE UP (ref 80–100)
MONOCYTES # BLD AUTO: 0.49 K/UL — SIGNIFICANT CHANGE UP (ref 0–0.9)
MONOCYTES NFR BLD AUTO: 6.1 % — SIGNIFICANT CHANGE UP (ref 2–14)
NEUTROPHILS # BLD AUTO: 5.65 K/UL — SIGNIFICANT CHANGE UP (ref 1.8–7.4)
NEUTROPHILS NFR BLD AUTO: 69.9 % — SIGNIFICANT CHANGE UP (ref 43–77)
NITRITE UR-MCNC: POSITIVE
NRBC # BLD AUTO: 0 K/UL — SIGNIFICANT CHANGE UP (ref 0–0)
NRBC # FLD: 0 K/UL — SIGNIFICANT CHANGE UP (ref 0–0)
NRBC BLD AUTO-RTO: 0 /100 WBCS — SIGNIFICANT CHANGE UP (ref 0–0)
PH UR: 7.5 — SIGNIFICANT CHANGE UP (ref 5–8)
PLATELET # BLD AUTO: 315 K/UL — SIGNIFICANT CHANGE UP (ref 150–400)
PMV BLD: 9.1 FL — SIGNIFICANT CHANGE UP (ref 7–13)
POTASSIUM SERPL-MCNC: 4.2 MMOL/L — SIGNIFICANT CHANGE UP (ref 3.5–5.3)
POTASSIUM SERPL-SCNC: 4.2 MMOL/L — SIGNIFICANT CHANGE UP (ref 3.5–5.3)
PROT SERPL-MCNC: 6.5 G/DL — SIGNIFICANT CHANGE UP (ref 6–8.3)
PROT UR-MCNC: 300 MG/DL
PROTHROM AB SERPL-ACNC: 12.9 SEC — SIGNIFICANT CHANGE UP (ref 9.9–13.4)
RBC # BLD: 4.18 M/UL — LOW (ref 4.2–5.8)
RBC # FLD: 13.6 % — SIGNIFICANT CHANGE UP (ref 10.3–14.5)
RBC CASTS # UR COMP ASSIST: 791 /HPF — HIGH (ref 0–4)
REVIEW: SIGNIFICANT CHANGE UP
RSV RNA NPH QL NAA+NON-PROBE: SIGNIFICANT CHANGE UP
SARS-COV-2 RNA SPEC QL NAA+PROBE: SIGNIFICANT CHANGE UP
SODIUM SERPL-SCNC: 141 MMOL/L — SIGNIFICANT CHANGE UP (ref 135–145)
SOURCE RESPIRATORY: SIGNIFICANT CHANGE UP
SP GR SPEC: 1.02 — SIGNIFICANT CHANGE UP (ref 1–1.03)
SQUAMOUS # UR AUTO: 5 /HPF — SIGNIFICANT CHANGE UP (ref 0–5)
TRI-PHOS CRY UR QL COMP ASSIST: PRESENT
TSH SERPL-MCNC: 1.12 UIU/ML — SIGNIFICANT CHANGE UP (ref 0.27–4.2)
UROBILINOGEN FLD QL: 1 MG/DL — SIGNIFICANT CHANGE UP (ref 0.2–1)
WBC # BLD: 8.07 K/UL — SIGNIFICANT CHANGE UP (ref 3.8–10.5)
WBC # FLD AUTO: 8.07 K/UL — SIGNIFICANT CHANGE UP (ref 3.8–10.5)
WBC UR QL: 54 /HPF — HIGH (ref 0–5)

## 2025-07-19 PROCEDURE — 74177 CT ABD & PELVIS W/CONTRAST: CPT | Mod: 26

## 2025-07-19 PROCEDURE — 93010 ELECTROCARDIOGRAM REPORT: CPT

## 2025-07-19 PROCEDURE — 99285 EMERGENCY DEPT VISIT HI MDM: CPT

## 2025-07-19 PROCEDURE — 71045 X-RAY EXAM CHEST 1 VIEW: CPT | Mod: 26

## 2025-07-19 RX ORDER — KETOROLAC TROMETHAMINE 30 MG/ML
1 INJECTION, SOLUTION INTRAMUSCULAR; INTRAVENOUS
Qty: 20 | Refills: 0
Start: 2025-07-19 | End: 2025-07-23

## 2025-07-19 RX ORDER — CEFTRIAXONE 500 MG/1
1000 INJECTION, POWDER, FOR SOLUTION INTRAMUSCULAR; INTRAVENOUS ONCE
Refills: 0 | Status: COMPLETED | OUTPATIENT
Start: 2025-07-19 | End: 2025-07-19

## 2025-07-19 RX ORDER — ACETAMINOPHEN 500 MG/5ML
1000 LIQUID (ML) ORAL ONCE
Refills: 0 | Status: COMPLETED | OUTPATIENT
Start: 2025-07-19 | End: 2025-07-19

## 2025-07-19 RX ORDER — CIPROFLOXACIN HCL 250 MG
500 TABLET ORAL ONCE
Refills: 0 | Status: DISCONTINUED | OUTPATIENT
Start: 2025-07-19 | End: 2025-07-23

## 2025-07-19 RX ORDER — CIPROFLOXACIN HCL 250 MG
1 TABLET ORAL
Qty: 14 | Refills: 0
Start: 2025-07-19 | End: 2025-07-25

## 2025-07-19 RX ADMIN — Medication 4 MILLIGRAM(S): at 16:51

## 2025-07-19 RX ADMIN — Medication 4 MILLIGRAM(S): at 20:43

## 2025-07-19 RX ADMIN — Medication 1000 MILLILITER(S): at 14:37

## 2025-07-19 RX ADMIN — Medication 400 MILLIGRAM(S): at 14:37

## 2025-07-19 RX ADMIN — CEFTRIAXONE 100 MILLIGRAM(S): 500 INJECTION, POWDER, FOR SOLUTION INTRAMUSCULAR; INTRAVENOUS at 16:56

## 2025-07-19 NOTE — ED ADULT NURSE NOTE - NS_SISCREENINGSR_GEN_ALL_ED
Virtual/Telephone Check-In    Mayuri Montero verbally consents to a Virtual/Telephone Check-In service on 05/26/20. Patient understands and accepts financial responsibility for any deductible, co-insurance and/or co-pays associated with this service. 2.5 MG Oral Tab Take 1 tablet (2.5 mg total) by mouth daily.  90 tablet 1   • Levothyroxine Sodium 125 MCG Oral Tab Take 1 tablet (125 mcg total) by mouth before breakfast. 90 tablet 0   • aMILoride-hydroCHLOROthiazide 5-50 MG Oral Tab Take 1 tablet by mout Alcohol/week: 2.0 standard drinks      Types: 2 Glasses of wine per week      Comment: 1-2x/week    Drug use: No    Family History   Problem Relation Age of Onset   • Cancer Father    • Heart Disorder Mother    • Diabetes Mother    • Other (Other) Sister and decision making. Appropriate medical decision-making and tests are ordered as detailed in the plan of care above.     Duration of the service: 12 min Negative

## 2025-07-19 NOTE — ED PROVIDER NOTE - CLINICAL SUMMARY MEDICAL DECISION MAKING FREE TEXT BOX
75-year-old male PMHx significant for CAD, stents x 2, congenital extrophy of bladder, s/p urostomy placed 2005, presenting with hematuria x 6 days and lower abdominal pain. Physical exam notable for well-appearing male in no acute distress.  Mild right lower quadrant tenderness to palpation, hematuria observed in urostomy.  Otherwise no focal findings on exam.  Given HPI and significant urologic history, concern for UTI, urologic cancer, surgical complication.  Will obtain CT abdomen pelvis to evaluate for acute surgical complications versus inflammatory process. Will obtain screening labs and provide analgesia.  Further steps in management pending initial workup.

## 2025-07-19 NOTE — ED ADULT TRIAGE NOTE - CHIEF COMPLAINT QUOTE
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   Pt c/o hematuria in urostomy, RUQ abd pain , intermittent dizziness, fatigue and HER x 6 days. Pt was scheduled for CT scan during week but started c/o right flank pain x 1 day.

## 2025-07-19 NOTE — ED PROVIDER NOTE - ATTENDING CONTRIBUTION TO CARE
DR. BLOCH, ATTENDING MD-  I performed a face to face bedside interview with patient regarding history of present illness, review of symptoms and past medical history. I completed an independent physical exam.  I have discussed patient's plan of care with the resident.  Patient mildly ill-appearing no acute distress alert and oriented x 3 HEENT unremarkable heart sounds normal lungs clear afebrile abdomen soft no focal tenderness urostomy tube with pink-tinged urine extremities no edema.

## 2025-07-19 NOTE — ED PROVIDER NOTE - PHYSICAL EXAMINATION
GEN: Patient awake and alert. No acute distress, non-toxic.  Head: normocephalic, atraumatic   CARDIAC: RRR.    PULM: CTA B/L no wheeze. No signs of respiratory distress, no accessory muscle usage or nasal flaring.  ABD: Soft, mild RLQ tenderness, nondistended. No rebound, no involuntary guarding. urostomy bag in place, c/d/i   : No CVA tenderness, mild suprapubic tenderness.  MSK: Moving all extremities spontaneously.  NEURO: A&Ox3, Gait normal.  SKIN: Warm, dry

## 2025-07-19 NOTE — ED PROVIDER NOTE - OBJECTIVE STATEMENT
75-year-old male PMHx significant for CAD, stents x 2, congenital extrophy of bladder, s/p urostomy placed 2005, presenting with hematuria x 6 days and lower abdominal pain.  Patient sees Dr. Hart ([facility] urologist) who instructed patient to present to the ED for further workup.  States since Monday patient has had abrahan red hematuria and now lighter in color with development of lower abdominal pain over the last 2-3 days.  Does endorse intermittent dizziness/lightheadedness.  Denies F/C, CP, SOB, decreased urine output, diarrhea.  Does note since creation of urostomy patient has had 2 prior episodes of self resolving hematuria within 2 days however this episode has continued and accompanied by abdominal pain unlike prior similar episodes which prompted visit to the ED for further evaluation.

## 2025-07-19 NOTE — CONSULT NOTE ADULT - SUBJECTIVE AND OBJECTIVE BOX
UROLOGY HPI (CONSULT)    SUBJECTIVE:   YOSELIN DORADO  75-year-old male PMHx significant for CAD, stents x 2, congenital extrophy of bladder, s/p urostomy placed , presenting with hematuria x 6 days and lower abdominal pain.  Patient sees Dr. Hart ([facility] urologist) who instructed patient to present to the ED for further workup.  States since Monday patient has had abrahan red hematuria and now lighter in color with development of lower abdominal pain over the last 2-3 days.  Does endorse intermittent dizziness/lightheadedness.  Denies F/C, CP, SOB, decreased urine output, diarrhea.  Does note since creation of urostomy patient has had 2 prior episodes of self resolving hematuria within 2 days however this episode has continued and accompanied by abdominal pain unlike prior similar episodes which prompted visit to the ED for further evaluation.  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 Hart. He reports that he started having gross hematuria from his conduit on Monday (), initially very dark red in color but no clots. Also reports feeling generally unwell, but no f/c, no n/v, no decreased output from his conduit. He saw Dr. Hart in the office on  who recommended repeat CT scan, which he was scheduled to get next Tuesday. Urine culture at that time was negative for infection. He was told at that visit to come to the ED if the hematuria persisted or if he started having pain. He reports starting to have R sided lower abdominal pain and R flank pain 2-3 days ago that progressively worsened, prompting him to present to the ED. Also reports some dizziness/lightheadedness at home. States that he had UTI a few months ago but did not have any hematuria at that time.     In ED, pt is AFVSS. WBC 8, H/H 11.8/35.3, Cr 0.73. UA orange with large blood, large LE, +nitrite, 54 WBCs, 791 RBCs, and many bacteria, +triple phosphate crystals. CT shows new mild b/l hydro compared to prior scan.     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      Encephalopathy in sepsis  due to urinary disorder needed surgery diversion of ureter       CAD (coronary artery disease)      H/O neuropathy      Hyperlipidemia      DVT, lower extremity      HTN (hypertension)      History of urinary diversion procedure  with urostomy  2014 with bag      H/O laminectomy      History of bladder surgery      Stented coronary artery          MEDICATIONS  (STANDING):    MEDICATIONS  (PRN):      FAMILY HISTORY:  Family history of acute lymphoid leukemia (Father)        Allergies    Sulfur Precipitated (Rash)    Intolerances        Social History:  Review of Systems: Otherwise negative as stated in HPI    OBJECTIVE:    Vital Signs:  T(C): 36.7 (25 @ 20:54), Max: 36.7 (25 @ 15:42)  HR: 76 (25 @ 20:54)  BP: 111/65 (25 @ 20:54)  SpO2: 100% (25 @ 20:54)  Wt(kg): --    Output:      Physical Exam:  Gen: NAD  Pulm: No respiratory distress	  GI: Mild tenderness in RLQ just adjacent to urostomy  : Urostomy pink and productive. Bedside urinal with clear reddish yellow urine, no clots.   MSK: moves all 4 limbs spontaneously    LABS:       @ 14:10    WBC 8.07  / Hct 35.3  / SCr 0.73         141  |  107  |  19  ----------------------------<  93  4.2   |  20[L]  |  0.73    Ca    9.6      2025 14:10    TPro  6.5  /  Alb  3.8  /  TBili  0.3  /  DBili  x   /  AST  11  /  ALT  9   /  AlkPhos  92      PT/INR - ( 2025 14:10 )   PT: 12.9 sec;   INR: 1.08 ratio         PTT - ( 2025 14:10 )  PTT:34.5 sec  Urinalysis Basic - ( 2025 14:10 )    Color: Orange / Appearance: Turbid / S.020 / pH: x  Gluc: 93 mg/dL / Ketone: x  / Bili: Small / Urobili: 1.0 mg/dL   Blood: x / Protein: 300 mg/dL / Nitrite: Positive   Leuk Esterase: Large / RBC: 791 /HPF / WBC 54 /HPF   Sq Epi: x / Non Sq Epi: 5 /HPF / Bacteria: Many /HPF          Urinalysis with Rflx Culture (collected 25 @ 14:10)        Urine Cx:    Blood Cx:    RADIOLOGY:      ACC: [id number] EXAM:  CT ABDOMEN AND PELVIS IC   ORDERED BY: SHANNON HOBBS     PROCEDURE DATE:  2025          INTERPRETATION:  CLINICAL INFORMATION: Hematuria. Urostomy in place.   Right lower quadrant pain.    COMPARISON: 2024.    CONTRAST/COMPLICATIONS:  IV Contrast: Omnipaque 350  90 cc administered   10 cc discarded  Oral Contrast: NONE.    PROCEDURE:  CT of the Abdomen and Pelvis was performed.  Sagittal and coronal reformats were performed.    FINDINGS:  LOWER CHEST: Within normal limits.    LIVER: Within normal limits.  BILE DUCTS: Normal caliber.  GALLBLADDER: Within normal limits.  SPLEEN: Within normal limits.  PANCREAS: Within normal limits.  ADRENALS: Within normal limits.  KIDNEYS/URETERS: Bilateral hydronephrosis, newcompared with prior. Left   renal cyst.    BLADDER: Status post cystectomy. Ileal conduit.  REPRODUCTIVE ORGANS: Normal prostate. Stable postsurgical changes within   the pelvis.    BOWEL: Sigmoid and small bowel anastomoses. No obstruction. Appendixis   normal.  PERITONEUM/RETROPERITONEUM: Within normal limits.  VESSELS: Atherosclerotic changes.  LYMPH NODES: No lymphadenopathy.  ABDOMINAL WALL: Right lower quadrant urostomy. Stable postsurgical   changes in the pelvis.  BONES: Multilevel degenerative changes.    IMPRESSION:    Mild bilateral hydronephrosis, new from prior.        --- End of Report ---            PARK COLLADO MD; Attending Radiologist  This document has been electronically signed. 2025  5:52PM

## 2025-07-19 NOTE — CONSULT NOTE ADULT - ASSESSMENT
75M w/ complex urologic hx including bladder exstrophy s/p cystectomy, s/p ureterosigmidostomies c/b recurrent episodes of ammonia encephalopathy who is now s/p ileal conduit creation in 2014 w/ Dr. Hart who presents with gross hematuria x 6 days with associated lower abdominal pain. UA suggestive of infection, although Ucx from 7/16 was negative. CT with new mild bilateral hydro. Hematuria and hydro may be i/s/o UTI. Pt otherwise stable.     Recommendations:   - Dispo per ED   - PO pain control (Tylenol/Motrin ATC at home or can send Rx for PO toradol to alternate with Tylenol)   - Prior cx from 2023 grew pan-sensitive E. faecalis and E. coli, can send home with ciprofloxacin x 7 days   - Will add pt to culture list to follow up Ucx   - F/u outpatient w/ Dr. Hart     Discussed w/ attending on-call, Dr. Cueva.  [facility]  [address] [place] [address], [address] [address] [phone]

## 2025-07-19 NOTE — ED ADULT NURSE NOTE - OBJECTIVE STATEMENT
Pt arrives ambulatory on room air c/o hematuria x1 week. Pt hx of CAD x2 stents on ASA, urostomy, HTN/HLD. Pt states he began to experience hematuria on Monday, on Thursday began to experience right abdominal pain radiating to right flank. Pt respirations even and unlabored, chest rise and fall equal b/l. Pt denies chest pain, HA, SOB, dizziness, N/V/D, fever/chills. Pt medicated per EMAR. Pt pending diagnostic imaging. Pt safety maintained. Pt arrives ambulatory on room air c/o hematuria x1 week. Pt hx of CAD x2 stents on ASA, urostomy, HTN/HLD. Pt states he began to experience hematuria on Monday, on Thursday began to experience right abdominal pain radiating to right flank. Urine appears red with small clots. Pt respirations even and unlabored, chest rise and fall equal b/l. Pt denies chest pain, HA, SOB, dizziness, N/V/D, fever/chills. Pt medicated per EMAR. Pt pending diagnostic imaging. Pt safety maintained.

## 2025-07-19 NOTE — ED PROVIDER NOTE - PROGRESS NOTE DETAILS
Laboratory work notable for urinary tract infection.  Patient given ceftriaxone for treatment.  CT imaging obtained notable for mild bilateral hydronephrosis, new from prior.  Given this finding and patient's extensive urologic history, urology was consulted.  Fellow to review imaging and cultures and provide recommendations.  Further steps in management pending urology recommendations and evaluation. Raymond Mccoy MD PGY-2: spoke with urology, dc home with abx and pain control. Followup clinic. Incidental imaging findings discussed with patient. Pt understood and agreed to plan. Return precautions discussed. All questions answered.

## 2025-07-19 NOTE — ED PROVIDER NOTE - PATIENT PORTAL LINK FT
You can access the FollowMyHealth Patient Portal offered by [facility] by registering at the following website: [email or web address]. By joining [employer]’s FollowMyHealth portal, you will also be able to view your health information using other applications (apps) compatible with our system.

## 2025-07-19 NOTE — ED PROVIDER NOTE - CARE PROVIDER_API CALL
Ricardo Hart ()  Urology  [address] [facility], [address] [address]  Phone: [phone]  Fax: [phone]  Follow Up Time: 4-6 Days

## 2025-07-19 NOTE — ED ADULT NURSE NOTE - NSFALLHARMRISKINTERV_ED_ALL_ED

## 2025-07-19 NOTE — ED PROVIDER NOTE - NSFOLLOWUPINSTRUCTIONS_ED_ALL_ED_FT
You are seen for blood in urine.    Blood work is reassuring with no elevated white count.  Electrolytes were normal.  Urine showed evidence of infection.  CT scan showed new hydronephrosis in both kidneys (fluid backing up in the kidney).  Otherwise no other new findings.  Urology evaluated you recommend antibiotics and pain control.    Will discharge you on ciprofloxacin 500 mg every 12 hours for 7 days.  Take Tylenol 1000 mg every 6 hours.  We also send Toradol 10 mg which you can take every 6 hours as needed for pain.  If anything else call the urology office.    Follow-up with Dr. Hart this week.  Urology will follow up on the culture results.    Return to the emergency department for any the following symptoms: Confusion, fast heart rate, worsening abdominal pain, back or flank pain, fevers, unable to pee, chest pain, difficulty breathing or any other concerning symptoms.

## 2025-07-19 NOTE — ED ADULT NURSE REASSESSMENT NOTE - NS ED NURSE REASSESS COMMENT FT1
Report received from DILCIA Hernandez. Patient is alert and in no signs of acute distress. Patient medicated per chart for abdominal pain. Patient noted to have urostomy in place; dark urine draining. Respirations even and unlabored, chest rise symmetrical b/l. Comfort measures maintained. Patient pending urology consult. Bed in lowest position. Safety maintained.

## 2025-07-21 LAB — URINE CYTOLOGY: NORMAL

## 2025-07-22 ENCOUNTER — APPOINTMENT (OUTPATIENT)
Dept: CT IMAGING | Facility: CLINIC | Age: 75
End: 2025-07-22

## 2025-07-24 ENCOUNTER — APPOINTMENT (OUTPATIENT)
Dept: ORTHOPEDIC SURGERY | Facility: CLINIC | Age: 75
End: 2025-07-24
Payer: MEDICARE

## 2025-07-24 VITALS
WEIGHT: 170 LBS | DIASTOLIC BLOOD PRESSURE: 73 MMHG | HEART RATE: 62 BPM | BODY MASS INDEX: 27.32 KG/M2 | SYSTOLIC BLOOD PRESSURE: 112 MMHG | HEIGHT: 66 IN

## 2025-07-24 DIAGNOSIS — Z98.890 OTHER SPECIFIED POSTPROCEDURAL STATES: ICD-10-CM

## 2025-07-24 DIAGNOSIS — M67.959 UNSPECIFIED DISORDER OF SYNOVIUM AND TENDON, UNSPECIFIED THIGH: ICD-10-CM

## 2025-07-24 DIAGNOSIS — M60.9 MYOSITIS, UNSPECIFIED: ICD-10-CM

## 2025-07-24 PROCEDURE — 99213 OFFICE O/P EST LOW 20 MIN: CPT | Mod: 24

## 2025-07-24 PROCEDURE — 20552 NJX 1/MLT TRIGGER POINT 1/2: CPT

## 2025-07-28 ENCOUNTER — NON-APPOINTMENT (OUTPATIENT)
Age: 75
End: 2025-07-28

## 2025-08-04 ENCOUNTER — RX RENEWAL (OUTPATIENT)
Age: 75
End: 2025-08-04

## 2025-08-06 ENCOUNTER — APPOINTMENT (OUTPATIENT)
Dept: MRI IMAGING | Facility: CLINIC | Age: 75
End: 2025-08-06

## 2025-08-11 ENCOUNTER — APPOINTMENT (OUTPATIENT)
Dept: MRI IMAGING | Facility: CLINIC | Age: 75
End: 2025-08-11
Payer: MEDICARE

## 2025-08-11 PROCEDURE — 73721 MRI JNT OF LWR EXTRE W/O DYE: CPT | Mod: RT

## 2025-08-19 ENCOUNTER — APPOINTMENT (OUTPATIENT)
Dept: ORTHOPEDIC SURGERY | Facility: CLINIC | Age: 75
End: 2025-08-19
Payer: MEDICARE

## 2025-08-19 VITALS
HEART RATE: 78 BPM | BODY MASS INDEX: 27 KG/M2 | WEIGHT: 168 LBS | HEIGHT: 66 IN | SYSTOLIC BLOOD PRESSURE: 113 MMHG | DIASTOLIC BLOOD PRESSURE: 66 MMHG

## 2025-08-19 DIAGNOSIS — S73.191A OTHER SPRAIN OF RIGHT HIP, INITIAL ENCOUNTER: ICD-10-CM

## 2025-08-19 DIAGNOSIS — S76.011A STRAIN OF MUSCLE, FASCIA AND TENDON OF RIGHT HIP, INITIAL ENCOUNTER: ICD-10-CM

## 2025-08-19 DIAGNOSIS — K21.9 GASTRO-ESOPHAGEAL REFLUX DISEASE W/OUT ESOPHAGITIS: ICD-10-CM

## 2025-08-19 DIAGNOSIS — S76.011S STRAIN OF MUSCLE, FASCIA AND TENDON OF RIGHT HIP, SEQUELA: ICD-10-CM

## 2025-08-19 PROCEDURE — 99214 OFFICE O/P EST MOD 30 MIN: CPT

## (undated) DEVICE — POSITIONER FOAM LAMINECTOMY ARM CRADLE (PINK)

## (undated) DEVICE — WARMING BLANKET UPPER ADULT

## (undated) DEVICE — VENODYNE/SCD SLEEVE CALF MEDIUM

## (undated) DEVICE — DRAPE XL SHEET 77X98"

## (undated) DEVICE — DRAPE TOWEL BLUE 17" X 24"

## (undated) DEVICE — SOL IRR BAG NS 0.9% 3000ML

## (undated) DEVICE — SUT MONOCRYL 3-0 27" PS-2 UNDYED

## (undated) DEVICE — COVER PROBE W/GEL 18X120CM STRL 50/BX

## (undated) DEVICE — DRAPE C ARM UNIVERSAL

## (undated) DEVICE — DRAPE LEGGINGS

## (undated) DEVICE — SUT VICRYL PLUS 0 18" CT-1 UNDYED (POP-OFF)

## (undated) DEVICE — GLV 8 PROTEXIS (WHITE)

## (undated) DEVICE — TRAP SPECIMEN SPUTUM 40CC

## (undated) DEVICE — PACK CYSTO

## (undated) DEVICE — SUT VICRYL 2-0 18" CT-2 (POP-OFF)

## (undated) DEVICE — GOWN XL W TOWEL

## (undated) DEVICE — WOUND IRR SURGIPHOR

## (undated) DEVICE — SOL IRR BAG H2O 3000ML

## (undated) DEVICE — DRAPE INSTRUMENT POUCH 6.75" X 11"

## (undated) DEVICE — DRAPE 3/4 SHEET 52X76"

## (undated) DEVICE — DRSG TEGADERM 4 X 4.75"

## (undated) DEVICE — ELCTR BOVIE TIP BLADE INSULATED 2.75" EDGE

## (undated) DEVICE — SOL IRR POUR H2O 1000ML

## (undated) DEVICE — SOL IRR POUR H2O 500ML

## (undated) DEVICE — TUBING TUR 2 PRONG

## (undated) DEVICE — MIDAS REX MR8 MATCH HEAD FLUTED SM BORE 3MM X 10CM

## (undated) DEVICE — ELCTR BOVIE PENCIL BLADE 10FT

## (undated) DEVICE — ADAPTER CHECK FLO 9FR STERILE

## (undated) DEVICE — FRAZIER CONNECTING TUBE 2FT 5MM

## (undated) DEVICE — POSITIONER FOAM EGG CRATE ULNAR 2PCS (PINK)

## (undated) DEVICE — DRSG TELFA 3 X 4

## (undated) DEVICE — POSITIONER PATIENT SAFETY STRAP 3X60"

## (undated) DEVICE — DRAIN JACKSON PRATT 7MM FLAT FULL W 15 FR TROCAR

## (undated) DEVICE — GLV 7.5 PROTEXIS (WHITE)

## (undated) DEVICE — MARKING PEN W RULER

## (undated) DEVICE — DRSG BIOPATCH DISK W CHG 1" W 7.0MM HOLE

## (undated) DEVICE — ELCTR GROUNDING PAD ADULT COVIDIEN

## (undated) DEVICE — IRR BULB PATHFINDER + 10"

## (undated) DEVICE — TUBING FOR SMOKE EVACUATOR (PURPLE END)

## (undated) DEVICE — DRAPE SPLIT SHEET 77" X 108"

## (undated) DEVICE — FOLEY TRAY 16FR LF URINE METER SURESTEP

## (undated) DEVICE — DRSG MEPILEX 10 X 10CM (4 X 4") AG

## (undated) DEVICE — SOL IRR POUR NS 0.9% 1000ML

## (undated) DEVICE — TUBING BIPOLAR IRRIGATOR AND CORD SET

## (undated) DEVICE — LABELS BLANK W PEN

## (undated) DEVICE — PRESSURE INFUSOR BAG 3000ML

## (undated) DEVICE — PACK NEURO

## (undated) DEVICE — BASIN SET DOUBLE

## (undated) DEVICE — DRSG STERISTRIPS 0.5 X 4"

## (undated) DEVICE — DRSG DERMABOND 0.7ML

## (undated) DEVICE — SUT ETHILON 3-0 18" PS-1

## (undated) DEVICE — POSITIONER JACKSON TABLE HEADREST 7", CHEST, HIP, THIGH PADS, ARM CRADLE